# Patient Record
Sex: FEMALE | Race: BLACK OR AFRICAN AMERICAN | NOT HISPANIC OR LATINO | Employment: OTHER | URBAN - METROPOLITAN AREA
[De-identification: names, ages, dates, MRNs, and addresses within clinical notes are randomized per-mention and may not be internally consistent; named-entity substitution may affect disease eponyms.]

---

## 2023-02-13 PROBLEM — R07.9 CHEST PAIN: Status: ACTIVE | Noted: 2023-02-13

## 2023-02-13 PROBLEM — R92.8 ABNORMAL MAMMOGRAM: Status: ACTIVE | Noted: 2023-02-13

## 2023-02-13 PROBLEM — E55.9 VITAMIN D DEFICIENCY: Status: ACTIVE | Noted: 2023-02-13

## 2023-02-13 PROBLEM — R06.02 SOB (SHORTNESS OF BREATH): Status: ACTIVE | Noted: 2023-02-13

## 2023-02-13 PROBLEM — H61.23 BILATERAL IMPACTED CERUMEN: Status: ACTIVE | Noted: 2023-02-13

## 2023-02-13 PROBLEM — I50.23 ACUTE ON CHRONIC SYSTOLIC HEART FAILURE (MULTI): Status: ACTIVE | Noted: 2023-02-13

## 2023-02-13 PROBLEM — J18.9 COMMUNITY ACQUIRED PNEUMONIA: Status: ACTIVE | Noted: 2023-02-13

## 2023-02-13 PROBLEM — I63.9 CVA (CEREBRAL VASCULAR ACCIDENT) (MULTI): Status: ACTIVE | Noted: 2023-02-13

## 2023-02-13 PROBLEM — G47.33 OSA (OBSTRUCTIVE SLEEP APNEA): Status: ACTIVE | Noted: 2023-02-13

## 2023-02-13 PROBLEM — I51.9 CHRONIC LEFT VENTRICULAR SYSTOLIC DYSFUNCTION: Status: ACTIVE | Noted: 2023-02-13

## 2023-02-13 PROBLEM — I10 HTN (HYPERTENSION): Status: ACTIVE | Noted: 2023-02-13

## 2023-02-13 PROBLEM — E53.8 VITAMIN B12 DEFICIENCY: Status: ACTIVE | Noted: 2023-02-13

## 2023-02-13 PROBLEM — N64.59 ABNORMAL BREAST EXAM: Status: ACTIVE | Noted: 2023-02-13

## 2023-02-13 PROBLEM — I63.9 STROKE (MULTI): Status: ACTIVE | Noted: 2023-02-13

## 2023-02-13 PROBLEM — I50.9 CONGESTIVE HEART FAILURE (MULTI): Status: ACTIVE | Noted: 2023-02-13

## 2023-02-13 PROBLEM — J44.9 CHRONIC OBSTRUCTIVE PULMONARY DISEASE (MULTI): Status: RESOLVED | Noted: 2023-02-13 | Resolved: 2023-02-13

## 2023-02-13 PROBLEM — J44.9 CHRONIC OBSTRUCTIVE PULMONARY DISEASE (MULTI): Status: ACTIVE | Noted: 2023-02-13

## 2023-02-13 PROBLEM — I42.9 CARDIOMYOPATHY (MULTI): Status: ACTIVE | Noted: 2023-02-13

## 2023-02-13 PROBLEM — Z95.810 PRESENCE OF CARDIAC DEFIBRILLATOR: Status: ACTIVE | Noted: 2023-02-13

## 2023-02-13 PROBLEM — M54.50 LOW BACK PAIN: Status: ACTIVE | Noted: 2023-02-13

## 2023-02-13 PROBLEM — I42.8 NONISCHEMIC CARDIOMYOPATHY (MULTI): Status: ACTIVE | Noted: 2023-02-13

## 2023-02-13 PROBLEM — Z86.79 HISTORY OF SUSTAINED VENTRICULAR TACHYCARDIA: Status: ACTIVE | Noted: 2023-02-13

## 2023-02-13 PROBLEM — R53.83 FATIGUE: Status: ACTIVE | Noted: 2023-02-13

## 2023-02-13 RX ORDER — HYDRALAZINE HYDROCHLORIDE 50 MG/1
1 TABLET, FILM COATED ORAL 2 TIMES DAILY
COMMUNITY
Start: 2019-04-26 | End: 2023-03-29

## 2023-02-13 RX ORDER — TIOTROPIUM BROMIDE 18 UG/1
1 CAPSULE ORAL; RESPIRATORY (INHALATION)
COMMUNITY
Start: 2016-02-29 | End: 2023-03-29

## 2023-02-13 RX ORDER — MULTIVITAMIN
1 TABLET ORAL
COMMUNITY
Start: 2020-10-28 | End: 2024-01-25

## 2023-02-13 RX ORDER — CARVEDILOL 25 MG/1
25 TABLET ORAL
COMMUNITY
Start: 2017-09-13 | End: 2023-10-27

## 2023-02-13 RX ORDER — FLUTICASONE PROPIONATE AND SALMETEROL 250; 50 UG/1; UG/1
1 POWDER RESPIRATORY (INHALATION) EVERY 12 HOURS
COMMUNITY
Start: 2017-12-12 | End: 2024-04-22 | Stop reason: ALTCHOICE

## 2023-02-13 RX ORDER — NITROGLYCERIN 0.4 MG/1
0.4 TABLET SUBLINGUAL EVERY 5 MIN PRN
COMMUNITY
Start: 2016-12-29 | End: 2023-04-28

## 2023-02-13 RX ORDER — FUROSEMIDE 40 MG/1
1 TABLET ORAL
COMMUNITY
Start: 2021-02-12 | End: 2023-05-30

## 2023-02-13 RX ORDER — BUPROPION HYDROCHLORIDE 300 MG/1
300 TABLET ORAL
COMMUNITY
End: 2024-01-02

## 2023-02-13 RX ORDER — MIRTAZAPINE 15 MG/1
1 TABLET, FILM COATED ORAL NIGHTLY
COMMUNITY
Start: 2020-06-30 | End: 2023-05-01 | Stop reason: ALTCHOICE

## 2023-02-13 RX ORDER — SPIRONOLACTONE 25 MG/1
1 TABLET ORAL 2 TIMES DAILY
COMMUNITY
Start: 2015-08-03 | End: 2023-10-27

## 2023-02-13 RX ORDER — LISINOPRIL 40 MG/1
1 TABLET ORAL
COMMUNITY
Start: 2016-03-17 | End: 2024-01-25

## 2023-02-13 RX ORDER — ATORVASTATIN CALCIUM 40 MG/1
1 TABLET, FILM COATED ORAL
COMMUNITY
Start: 2017-09-28 | End: 2023-06-28

## 2023-02-13 RX ORDER — ALBUTEROL SULFATE 90 UG/1
2 AEROSOL, METERED RESPIRATORY (INHALATION) 2 TIMES DAILY
COMMUNITY
Start: 2016-05-02 | End: 2023-06-14

## 2023-02-13 RX ORDER — DIGOXIN 125 MCG
1 TABLET ORAL
COMMUNITY
Start: 2017-07-10 | End: 2024-01-25

## 2023-02-13 RX ORDER — ALBUTEROL SULFATE 0.83 MG/ML
2.5 SOLUTION RESPIRATORY (INHALATION) EVERY 4 HOURS PRN
COMMUNITY
Start: 2016-12-29 | End: 2023-05-01

## 2023-02-13 RX ORDER — NAPROXEN SODIUM 220 MG/1
1 TABLET, FILM COATED ORAL
COMMUNITY
Start: 2017-06-08 | End: 2023-06-28

## 2023-02-13 RX ORDER — VIT C/E/ZN/COPPR/LUTEIN/ZEAXAN 250MG-90MG
1 CAPSULE ORAL
COMMUNITY
Start: 2021-03-08 | End: 2023-03-29

## 2023-03-27 ENCOUNTER — APPOINTMENT (OUTPATIENT)
Dept: PRIMARY CARE | Facility: CLINIC | Age: 68
End: 2023-03-27
Payer: MEDICAID

## 2023-03-27 DIAGNOSIS — D50.9 IRON DEFICIENCY ANEMIA, UNSPECIFIED IRON DEFICIENCY ANEMIA TYPE: ICD-10-CM

## 2023-03-27 DIAGNOSIS — E03.9 HYPOTHYROIDISM, UNSPECIFIED TYPE: ICD-10-CM

## 2023-03-27 DIAGNOSIS — I10 HYPERTENSION, UNSPECIFIED TYPE: ICD-10-CM

## 2023-03-27 DIAGNOSIS — I10 BENIGN ESSENTIAL HYPERTENSION: ICD-10-CM

## 2023-03-27 LAB
ALANINE AMINOTRANSFERASE (SGPT) (U/L) IN SER/PLAS: 12 U/L (ref 7–45)
ALBUMIN (G/DL) IN SER/PLAS: 4.1 G/DL (ref 3.4–5)
ALKALINE PHOSPHATASE (U/L) IN SER/PLAS: 82 U/L (ref 33–136)
ANION GAP IN SER/PLAS: 15 MMOL/L (ref 10–20)
ASPARTATE AMINOTRANSFERASE (SGOT) (U/L) IN SER/PLAS: 94 U/L (ref 9–39)
BILIRUBIN TOTAL (MG/DL) IN SER/PLAS: 0.6 MG/DL (ref 0–1.2)
CALCIUM (MG/DL) IN SER/PLAS: 9.6 MG/DL (ref 8.6–10.6)
CARBON DIOXIDE, TOTAL (MMOL/L) IN SER/PLAS: 26 MMOL/L (ref 21–32)
CHLORIDE (MMOL/L) IN SER/PLAS: 104 MMOL/L (ref 98–107)
CREATININE (MG/DL) IN SER/PLAS: 0.96 MG/DL (ref 0.5–1.05)
ERYTHROCYTE DISTRIBUTION WIDTH (RATIO) BY AUTOMATED COUNT: 12.8 % (ref 11.5–14.5)
ERYTHROCYTE MEAN CORPUSCULAR HEMOGLOBIN CONCENTRATION (G/DL) BY AUTOMATED: 32.1 G/DL (ref 32–36)
ERYTHROCYTE MEAN CORPUSCULAR VOLUME (FL) BY AUTOMATED COUNT: 97 FL (ref 80–100)
ERYTHROCYTES (10*6/UL) IN BLOOD BY AUTOMATED COUNT: 4.65 X10E12/L (ref 4–5.2)
GFR FEMALE: 65 ML/MIN/1.73M2
GLUCOSE (MG/DL) IN SER/PLAS: 85 MG/DL (ref 74–99)
HEMATOCRIT (%) IN BLOOD BY AUTOMATED COUNT: 45.2 % (ref 36–46)
HEMOGLOBIN (G/DL) IN BLOOD: 14.5 G/DL (ref 12–16)
LEUKOCYTES (10*3/UL) IN BLOOD BY AUTOMATED COUNT: 9.6 X10E9/L (ref 4.4–11.3)
NRBC (PER 100 WBCS) BY AUTOMATED COUNT: 0 /100 WBC (ref 0–0)
PLATELETS (10*3/UL) IN BLOOD AUTOMATED COUNT: 227 X10E9/L (ref 150–450)
POTASSIUM (MMOL/L) IN SER/PLAS: 4.2 MMOL/L (ref 3.5–5.3)
PROTEIN TOTAL: 6.6 G/DL (ref 6.4–8.2)
SODIUM (MMOL/L) IN SER/PLAS: 141 MMOL/L (ref 136–145)
UREA NITROGEN (MG/DL) IN SER/PLAS: 12 MG/DL (ref 6–23)

## 2023-03-27 PROCEDURE — 80053 COMPREHEN METABOLIC PANEL: CPT

## 2023-03-27 PROCEDURE — 85027 COMPLETE CBC AUTOMATED: CPT

## 2023-03-27 PROCEDURE — 36415 COLL VENOUS BLD VENIPUNCTURE: CPT | Performed by: INTERNAL MEDICINE

## 2023-03-28 DIAGNOSIS — E55.9 VITAMIN D DEFICIENCY: ICD-10-CM

## 2023-03-28 DIAGNOSIS — I10 HYPERTENSION, UNSPECIFIED TYPE: ICD-10-CM

## 2023-03-28 DIAGNOSIS — I50.9 CONGESTIVE HEART FAILURE, UNSPECIFIED HF CHRONICITY, UNSPECIFIED HEART FAILURE TYPE (MULTI): ICD-10-CM

## 2023-03-29 RX ORDER — AMPICILLIN TRIHYDRATE 500 MG
CAPSULE ORAL
Qty: 30 CAPSULE | Refills: 10 | Status: SHIPPED | OUTPATIENT
Start: 2023-03-29 | End: 2024-02-26

## 2023-03-29 RX ORDER — TIOTROPIUM BROMIDE 18 UG/1
CAPSULE ORAL; RESPIRATORY (INHALATION)
Qty: 30 CAPSULE | Refills: 10 | Status: SHIPPED | OUTPATIENT
Start: 2023-03-29 | End: 2024-02-26

## 2023-03-29 RX ORDER — HYDRALAZINE HYDROCHLORIDE 50 MG/1
TABLET, FILM COATED ORAL
Qty: 60 TABLET | Refills: 10 | Status: SHIPPED | OUTPATIENT
Start: 2023-03-29 | End: 2024-02-26

## 2023-04-27 DIAGNOSIS — I63.9 CEREBROVASCULAR ACCIDENT (CVA), UNSPECIFIED MECHANISM (MULTI): ICD-10-CM

## 2023-04-28 RX ORDER — NITROGLYCERIN 0.4 MG/1
TABLET SUBLINGUAL
Qty: 25 TABLET | Refills: 10 | Status: SHIPPED | OUTPATIENT
Start: 2023-04-28 | End: 2024-04-22 | Stop reason: SDUPTHER

## 2023-04-28 ASSESSMENT — PROMIS GLOBAL HEALTH SCALE
RATE_SOCIAL_SATISFACTION: EXCELLENT
EMOTIONAL_PROBLEMS: NEVER
RATE_GENERAL_HEALTH: VERY GOOD
RATE_MENTAL_HEALTH: EXCELLENT
RATE_QUALITY_OF_LIFE: EXCELLENT
CARRYOUT_SOCIAL_ACTIVITIES: EXCELLENT
RATE_PHYSICAL_HEALTH: VERY GOOD
CARRYOUT_PHYSICAL_ACTIVITIES: A LITTLE
RATE_AVERAGE_PAIN: 0

## 2023-05-01 ENCOUNTER — OFFICE VISIT (OUTPATIENT)
Dept: PRIMARY CARE | Facility: CLINIC | Age: 68
End: 2023-05-01
Payer: MEDICAID

## 2023-05-01 VITALS
BODY MASS INDEX: 26.52 KG/M2 | DIASTOLIC BLOOD PRESSURE: 76 MMHG | RESPIRATION RATE: 12 BRPM | HEART RATE: 60 BPM | HEIGHT: 66 IN | WEIGHT: 165 LBS | SYSTOLIC BLOOD PRESSURE: 112 MMHG | OXYGEN SATURATION: 96 %

## 2023-05-01 DIAGNOSIS — J43.9 PULMONARY EMPHYSEMA, UNSPECIFIED EMPHYSEMA TYPE (MULTI): ICD-10-CM

## 2023-05-01 DIAGNOSIS — I63.039 CEREBROVASCULAR ACCIDENT (CVA) DUE TO THROMBOSIS OF CAROTID ARTERY, UNSPECIFIED BLOOD VESSEL LATERALITY (MULTI): ICD-10-CM

## 2023-05-01 DIAGNOSIS — Z00.00 ENCOUNTER FOR ANNUAL WELLNESS VISIT (AWV) IN MEDICARE PATIENT: ICD-10-CM

## 2023-05-01 DIAGNOSIS — I42.9 CARDIOMYOPATHY, UNSPECIFIED TYPE (MULTI): Primary | ICD-10-CM

## 2023-05-01 DIAGNOSIS — F41.9 ANXIETY: ICD-10-CM

## 2023-05-01 DIAGNOSIS — E55.9 VITAMIN D DEFICIENCY: ICD-10-CM

## 2023-05-01 DIAGNOSIS — I10 HYPERTENSION, UNSPECIFIED TYPE: ICD-10-CM

## 2023-05-01 DIAGNOSIS — I50.9 CONGESTIVE HEART FAILURE, UNSPECIFIED HF CHRONICITY, UNSPECIFIED HEART FAILURE TYPE (MULTI): ICD-10-CM

## 2023-05-01 PROBLEM — Z86.73 HX OF ARTERIAL ISCHEMIC STROKE: Status: ACTIVE | Noted: 2020-08-10

## 2023-05-01 PROBLEM — I48.0 PAROXYSMAL ATRIAL FIBRILLATION (MULTI): Status: ACTIVE | Noted: 2020-05-19

## 2023-05-01 PROBLEM — I67.1 CEREBRAL ANEURYSM, NONRUPTURED (HHS-HCC): Status: ACTIVE | Noted: 2020-08-11

## 2023-05-01 PROBLEM — I72.0 CAROTID ANEURYSM, LEFT (CMS-HCC): Status: ACTIVE | Noted: 2020-05-19

## 2023-05-01 PROBLEM — F19.10 POLYSUBSTANCE ABUSE (MULTI): Status: ACTIVE | Noted: 2020-05-19

## 2023-05-01 PROBLEM — U07.1 COVID-19: Status: ACTIVE | Noted: 2021-05-19

## 2023-05-01 PROBLEM — F17.200 NICOTINE USE DISORDER: Status: ACTIVE | Noted: 2020-05-20

## 2023-05-01 PROBLEM — K21.9 GERD (GASTROESOPHAGEAL REFLUX DISEASE): Status: ACTIVE | Noted: 2020-05-19

## 2023-05-01 PROBLEM — E78.5 HLD (HYPERLIPIDEMIA): Status: ACTIVE | Noted: 2020-05-19

## 2023-05-01 PROBLEM — F14.90 COCAINE USE: Status: ACTIVE | Noted: 2020-05-19

## 2023-05-01 LAB
POC APPEARANCE, URINE: CLEAR
POC BILIRUBIN, URINE: NEGATIVE
POC BLOOD, URINE: NEGATIVE
POC COLOR, URINE: ABNORMAL
POC GLUCOSE, URINE: NEGATIVE MG/DL
POC KETONES, URINE: NEGATIVE MG/DL
POC LEUKOCYTES, URINE: NEGATIVE
POC NITRITE,URINE: NEGATIVE
POC PH, URINE: 6 PH
POC PROTEIN, URINE: ABNORMAL MG/DL
POC SPECIFIC GRAVITY, URINE: >=1.03
POC UROBILINOGEN, URINE: 0.2 EU/DL

## 2023-05-01 PROCEDURE — 93000 ELECTROCARDIOGRAM COMPLETE: CPT | Performed by: INTERNAL MEDICINE

## 2023-05-01 PROCEDURE — 1159F MED LIST DOCD IN RCRD: CPT | Performed by: INTERNAL MEDICINE

## 2023-05-01 PROCEDURE — 1160F RVW MEDS BY RX/DR IN RCRD: CPT | Performed by: INTERNAL MEDICINE

## 2023-05-01 PROCEDURE — 3074F SYST BP LT 130 MM HG: CPT | Performed by: INTERNAL MEDICINE

## 2023-05-01 PROCEDURE — 81002 URINALYSIS NONAUTO W/O SCOPE: CPT | Performed by: INTERNAL MEDICINE

## 2023-05-01 PROCEDURE — 99214 OFFICE O/P EST MOD 30 MIN: CPT | Performed by: INTERNAL MEDICINE

## 2023-05-01 PROCEDURE — 3078F DIAST BP <80 MM HG: CPT | Performed by: INTERNAL MEDICINE

## 2023-05-01 RX ORDER — BUSPIRONE HYDROCHLORIDE 5 MG/1
5 TABLET ORAL 2 TIMES DAILY
Qty: 180 TABLET | Refills: 3 | Status: SHIPPED | OUTPATIENT
Start: 2023-05-01 | End: 2023-05-03 | Stop reason: SDUPTHER

## 2023-05-01 RX ORDER — IPRATROPIUM BROMIDE AND ALBUTEROL SULFATE 2.5; .5 MG/3ML; MG/3ML
3 SOLUTION RESPIRATORY (INHALATION) 4 TIMES DAILY PRN
Qty: 90 ML | Refills: 11 | Status: SHIPPED | OUTPATIENT
Start: 2023-05-01 | End: 2023-05-03 | Stop reason: SDUPTHER

## 2023-05-01 ASSESSMENT — PATIENT HEALTH QUESTIONNAIRE - PHQ9
SUM OF ALL RESPONSES TO PHQ9 QUESTIONS 1 AND 2: 0
2. FEELING DOWN, DEPRESSED OR HOPELESS: NOT AT ALL
1. LITTLE INTEREST OR PLEASURE IN DOING THINGS: NOT AT ALL

## 2023-05-01 ASSESSMENT — ENCOUNTER SYMPTOMS
LOSS OF SENSATION IN FEET: 0
OCCASIONAL FEELINGS OF UNSTEADINESS: 0
DEPRESSION: 0
NERVOUS/ANXIOUS: 1

## 2023-05-01 NOTE — PROGRESS NOTES
"Subjective :  Chief Complaint: Chiki Pineda is an 68 y.o. female here for an annual wellness visit and general medical care and f/u.     HPI:  Patient presents for annual wellness. Follow up for cardiomyopathy, HTN, HLD, stroke, CHF, COPD, s/p pacemaker and ICD. Patient says she is still smoking. Patient having nervousness and anxiety since her stroke.        Review of Systems   Psychiatric/Behavioral:  The patient is nervous/anxious.      All systems reviewed and negative except for what was mentioned in the HPI    Objective   /76   Pulse 60   Resp 12   Ht 1.676 m (5' 6\")   Wt 74.8 kg (165 lb)   SpO2 96%   BMI 26.63 kg/m²     Physical Exam  Vitals reviewed.   Constitutional:       Appearance: Normal appearance.   HENT:      Head: Normocephalic and atraumatic.   Cardiovascular:      Rate and Rhythm: Normal rate and regular rhythm.   Pulmonary:      Effort: Pulmonary effort is normal.      Breath sounds: Normal breath sounds.   Abdominal:      General: Bowel sounds are normal.      Palpations: Abdomen is soft.   Musculoskeletal:      Cervical back: Neck supple.   Skin:     General: Skin is warm and dry.   Neurological:      General: No focal deficit present.      Mental Status: She is alert.   Psychiatric:         Mood and Affect: Mood normal.         Behavior: Behavior is cooperative.         Imaging:  No results found.     Labs reviewed:    Lab Results   Component Value Date    WBC 9.6 03/27/2023    HGB 14.5 03/27/2023    HCT 45.2 03/27/2023     03/27/2023    CHOL 87 05/20/2022    TRIG 50 05/20/2022    HDL 46.7 05/20/2022    ALT 12 03/27/2023    AST 94 (H) 03/27/2023     03/27/2023    K 4.2 03/27/2023     03/27/2023    CREATININE 0.96 03/27/2023    BUN 12 03/27/2023    CO2 26 03/27/2023    TSH 2.08 05/20/2022    INR 1.1 07/02/2022    HGBA1C 6.2 06/23/2020       Past Medical, Surgical, and Family History reviewed and updated in chart.    I have reviewed and reconciled the medication " list with the patient today.   Current Outpatient Medications:     albuterol 90 mcg/actuation inhaler, Inhale 2 puffs 2 times a day., Disp: , Rfl:     aspirin 81 mg chewable tablet, Chew 1 tablet (81 mg) once every day., Disp: , Rfl:     atorvastatin (Lipitor) 40 mg tablet, Take 1 tablet (40 mg) by mouth once every day., Disp: , Rfl:     buPROPion XL (Wellbutrin XL) 300 mg 24 hr tablet, Take 1 tablet (300 mg) by mouth once every day. Do not crush, chew, or split., Disp: , Rfl:     carbamide peroxide (Debrox) 6.5 % otic solution, Administer 5 drops into each ear 2 times a day., Disp: , Rfl:     carvedilol (Coreg) 25 mg tablet, Take 1 tablet (25 mg) by mouth 2 times a day with meals., Disp: , Rfl:     digoxin (Lanoxin) 125 MCG tablet, Take 1 tablet (125 mcg) by mouth once every day., Disp: , Rfl:     fluticasone propion-salmeteroL (Advair Diskus) 250-50 mcg/dose diskus inhaler, Inhale 1 puff every 12 hours., Disp: , Rfl:     furosemide (Lasix) 40 mg tablet, Take 1 tablet (40 mg) by mouth once every day., Disp: , Rfl:     hydrALAZINE (Apresoline) 50 mg tablet, TAKE ONE (1) TABLET BY MOUTH TWICE DAILY, Disp: 60 tablet, Rfl: 10    lisinopril 40 mg tablet, Take 1 tablet (40 mg) by mouth once every day., Disp: , Rfl:     multivitamin tablet, Take 1 tablet by mouth once every day., Disp: , Rfl:     nitroglycerin (Nitrostat) 0.4 mg SL tablet, DISSOLVE 1 TABLET UNDER THE TONGUE AS NEEDED FOR CHEST PAIN EVERY 5 MINUTES UP TO 3 TIMES. IF NO RELIEF CALL 911., Disp: 25 tablet, Rfl: 10    Spiriva with HandiHaler 18 mcg inhalation capsule, INHALE THE CONTENTS OF ONE (1) CAPSULE BY MOUTH VIA HANDIHALER ONCE DAILY AS DIRECTED *DO NOT SWALLOW CAPSULE*, Disp: 30 capsule, Rfl: 10    spironolactone (Aldactone) 25 mg tablet, Take 1 tablet (25 mg) by mouth 2 times a day., Disp: , Rfl:     Vitamin D3 25 mcg (1,000 unit) capsule, TAKE 1 CAPSULE BY MOUTH ONCE DAILY, Disp: 30 capsule, Rfl: 10    busPIRone (Buspar) 5 mg tablet, Take 1 tablet (5  mg) by mouth 2 times a day., Disp: 180 tablet, Rfl: 3    ipratropium-albuteroL (Duo-Neb) 0.5-2.5 mg/3 mL nebulizer solution, Take 3 mL by nebulization 4 times a day as needed for wheezing or shortness of breath., Disp: 90 mL, Rfl: 11     List of current healthcare providers:  Patient Care Team:  Ernestina Flynn MD as PCP - General     HRA:     Over the past 2 weeks, how often have you been bothered by any of the following problems?  Little interest or pleasure in doing things: Not at all  Feeling down, depressed, or hopeless: Not at all  Patient Health Questionnaire-2 Score: 0                         Assessment/Plan :  Problem List Items Addressed This Visit          Nervous    CVA (cerebral vascular accident) (CMS/HCC)     Continue statin, aspirin            Respiratory    Chronic obstructive pulmonary disease (CMS/HCC)     Discontinue advair and start symbicort and duoneb         Relevant Medications    ipratropium-albuteroL (Duo-Neb) 0.5-2.5 mg/3 mL nebulizer solution       Circulatory    Cardiomyopathy (CMS/HCC) - Primary     Contiue diuresis and ACE inhibitors, spirinolactone         Congestive heart failure (CMS/HCC)     Contiue diuresis and ACE inhibitors, spirinolactone         HTN (hypertension)     Continue lisinopril         Relevant Orders    ECG 12 lead (Clinic Performed) (Completed)       Endocrine/Metabolic    Vitamin D deficiency     Continue vitamin D          Other Visit Diagnoses       Encounter for annual wellness visit (AWV) in Medicare patient        Relevant Orders    ECG 12 lead (Clinic Performed) (Completed)    POCT UA (nonautomated) manually resulted (Completed)    Anxiety        Relevant Medications    busPIRone (Buspar) 5 mg tablet          The following health maintenance schedule was reviewed with the patient and provided in printed form in the after visit summary:  Health Maintenance   Topic Date Due    Yearly Adult Physical  Never done    Bone Density Scan  Never done    Colorectal  Cancer Screening  Never done    COVID-19 Vaccine (1) Never done    Pneumococcal Vaccine: 65+ Years (1 - PCV) Never done    Hepatitis C Screening  Never done    DTaP/Tdap/Td Vaccines (1 - Tdap) Never done    Zoster Vaccines (1 of 2) Never done    Diabetes Screening  06/23/2021    Mammogram  02/01/2023    TSH Level  05/20/2023    Influenza Vaccine (Season Ended) 09/01/2023    Echocardiogram  09/12/2023    Creatinine Level  03/27/2024    Potassium Level  03/27/2024    Lipid Panel  05/20/2027    HIB Vaccines  Aged Out    Hepatitis B Vaccines  Aged Out    IPV Vaccines  Aged Out    Hepatitis A Vaccines  Aged Out    Meningococcal Vaccine  Aged Out    Rotavirus Vaccines  Aged Out    HPV Vaccines  Aged Out       Advance Care Planning   No living will             Orders Placed This Encounter   Procedures    POCT UA (nonautomated) manually resulted    ECG 12 lead (Clinic Performed)       Continue current medications as listed  Follow up in 3 months  Check CMP  Abnormal AST, normal ALT - patient on statin  Schedule colonoscopy and mammogram

## 2023-05-03 DIAGNOSIS — J43.9 PULMONARY EMPHYSEMA, UNSPECIFIED EMPHYSEMA TYPE (MULTI): ICD-10-CM

## 2023-05-03 DIAGNOSIS — F41.9 ANXIETY: ICD-10-CM

## 2023-05-03 RX ORDER — BUSPIRONE HYDROCHLORIDE 5 MG/1
5 TABLET ORAL 2 TIMES DAILY
Qty: 180 TABLET | Refills: 3 | Status: SHIPPED | OUTPATIENT
Start: 2023-05-03 | End: 2024-02-26

## 2023-05-03 RX ORDER — IPRATROPIUM BROMIDE AND ALBUTEROL SULFATE 2.5; .5 MG/3ML; MG/3ML
3 SOLUTION RESPIRATORY (INHALATION) 4 TIMES DAILY PRN
Qty: 90 ML | Refills: 11 | Status: SHIPPED | OUTPATIENT
Start: 2023-05-03 | End: 2023-10-16 | Stop reason: SDUPTHER

## 2023-05-19 ENCOUNTER — APPOINTMENT (OUTPATIENT)
Dept: PRIMARY CARE | Facility: CLINIC | Age: 68
End: 2023-05-19
Payer: MEDICAID

## 2023-05-28 DIAGNOSIS — I50.9 CONGESTIVE HEART FAILURE, UNSPECIFIED HF CHRONICITY, UNSPECIFIED HEART FAILURE TYPE (MULTI): ICD-10-CM

## 2023-05-30 RX ORDER — FUROSEMIDE 40 MG/1
TABLET ORAL
Qty: 30 TABLET | Refills: 10 | Status: SHIPPED | OUTPATIENT
Start: 2023-05-30 | End: 2024-04-24

## 2023-06-08 DIAGNOSIS — J43.9 PULMONARY EMPHYSEMA, UNSPECIFIED EMPHYSEMA TYPE (MULTI): Primary | ICD-10-CM

## 2023-06-14 RX ORDER — ALBUTEROL SULFATE 90 UG/1
AEROSOL, METERED RESPIRATORY (INHALATION)
Qty: 18 G | Refills: 10 | OUTPATIENT
Start: 2023-06-14 | End: 2024-02-29

## 2023-06-23 ENCOUNTER — APPOINTMENT (OUTPATIENT)
Dept: PRIMARY CARE | Facility: CLINIC | Age: 68
End: 2023-06-23
Payer: MEDICAID

## 2023-06-28 DIAGNOSIS — E78.5 HYPERLIPIDEMIA, UNSPECIFIED HYPERLIPIDEMIA TYPE: ICD-10-CM

## 2023-06-28 RX ORDER — ATORVASTATIN CALCIUM 40 MG/1
TABLET, FILM COATED ORAL
Qty: 30 TABLET | Refills: 10 | Status: SHIPPED | OUTPATIENT
Start: 2023-06-28

## 2023-06-28 RX ORDER — NAPROXEN SODIUM 220 MG/1
TABLET, FILM COATED ORAL
Qty: 30 TABLET | Refills: 10 | Status: SHIPPED | OUTPATIENT
Start: 2023-06-28 | End: 2024-05-23

## 2023-08-15 ENCOUNTER — APPOINTMENT (OUTPATIENT)
Dept: PRIMARY CARE | Facility: CLINIC | Age: 68
End: 2023-08-15
Payer: MEDICAID

## 2023-08-16 ENCOUNTER — APPOINTMENT (OUTPATIENT)
Dept: PRIMARY CARE | Facility: CLINIC | Age: 68
End: 2023-08-16
Payer: MEDICAID

## 2023-08-25 ENCOUNTER — APPOINTMENT (OUTPATIENT)
Dept: PRIMARY CARE | Facility: CLINIC | Age: 68
End: 2023-08-25
Payer: MEDICAID

## 2023-09-08 ENCOUNTER — APPOINTMENT (OUTPATIENT)
Dept: PRIMARY CARE | Facility: CLINIC | Age: 68
End: 2023-09-08
Payer: MEDICAID

## 2023-09-14 ENCOUNTER — APPOINTMENT (OUTPATIENT)
Dept: PRIMARY CARE | Facility: CLINIC | Age: 68
End: 2023-09-14
Payer: MEDICAID

## 2023-10-09 ENCOUNTER — CLINICAL SUPPORT (OUTPATIENT)
Dept: PRIMARY CARE | Facility: CLINIC | Age: 68
End: 2023-10-09
Payer: MEDICAID

## 2023-10-09 DIAGNOSIS — E78.5 HYPERLIPIDEMIA, UNSPECIFIED HYPERLIPIDEMIA TYPE: ICD-10-CM

## 2023-10-09 DIAGNOSIS — E03.9 HYPOTHYROIDISM, UNSPECIFIED TYPE: ICD-10-CM

## 2023-10-09 DIAGNOSIS — D50.9 IRON DEFICIENCY ANEMIA, UNSPECIFIED IRON DEFICIENCY ANEMIA TYPE: ICD-10-CM

## 2023-10-09 DIAGNOSIS — I63.039 CEREBROVASCULAR ACCIDENT (CVA) DUE TO THROMBOSIS OF CAROTID ARTERY, UNSPECIFIED BLOOD VESSEL LATERALITY (MULTI): ICD-10-CM

## 2023-10-09 DIAGNOSIS — E55.9 VITAMIN D DEFICIENCY: ICD-10-CM

## 2023-10-09 DIAGNOSIS — Z00.00 ENCOUNTER FOR ANNUAL WELLNESS VISIT (AWV) IN MEDICARE PATIENT: ICD-10-CM

## 2023-10-09 DIAGNOSIS — I10 BENIGN ESSENTIAL HYPERTENSION: ICD-10-CM

## 2023-10-09 DIAGNOSIS — E78.00 ELEVATED LDL CHOLESTEROL LEVEL: ICD-10-CM

## 2023-10-09 DIAGNOSIS — I10 PRIMARY HYPERTENSION: ICD-10-CM

## 2023-10-09 LAB
25(OH)D3 SERPL-MCNC: 25 NG/ML (ref 30–100)
ALBUMIN SERPL BCP-MCNC: 4.2 G/DL (ref 3.4–5)
ALP SERPL-CCNC: 78 U/L (ref 33–136)
ALT SERPL W P-5'-P-CCNC: 6 U/L (ref 7–45)
ANION GAP SERPL CALC-SCNC: 17 MMOL/L (ref 10–20)
AST SERPL W P-5'-P-CCNC: 13 U/L (ref 9–39)
BILIRUB SERPL-MCNC: 0.9 MG/DL (ref 0–1.2)
BUN SERPL-MCNC: 16 MG/DL (ref 6–23)
CALCIUM SERPL-MCNC: 9.7 MG/DL (ref 8.6–10.6)
CHLORIDE SERPL-SCNC: 105 MMOL/L (ref 98–107)
CHOLEST SERPL-MCNC: 122 MG/DL (ref 0–199)
CHOLESTEROL/HDL RATIO: 2.7
CO2 SERPL-SCNC: 25 MMOL/L (ref 21–32)
CREAT SERPL-MCNC: 1.06 MG/DL (ref 0.5–1.05)
ERYTHROCYTE [DISTWIDTH] IN BLOOD BY AUTOMATED COUNT: 13.2 % (ref 11.5–14.5)
GFR SERPL CREATININE-BSD FRML MDRD: 58 ML/MIN/1.73M*2
GLUCOSE SERPL-MCNC: 71 MG/DL (ref 74–99)
HCT VFR BLD AUTO: 47.8 % (ref 36–46)
HDLC SERPL-MCNC: 45.1 MG/DL
HGB BLD-MCNC: 14.7 G/DL (ref 12–16)
LDLC SERPL CALC-MCNC: 65 MG/DL (ref 140–190)
MCH RBC QN AUTO: 29.9 PG (ref 26–34)
MCHC RBC AUTO-ENTMCNC: 30.8 G/DL (ref 32–36)
MCV RBC AUTO: 97 FL (ref 80–100)
NON HDL CHOLESTEROL: 77 MG/DL (ref 0–149)
NRBC BLD-RTO: 0 /100 WBCS (ref 0–0)
PLATELET # BLD AUTO: 238 X10*3/UL (ref 150–450)
PMV BLD AUTO: 11.6 FL (ref 7.5–11.5)
POTASSIUM SERPL-SCNC: 4.2 MMOL/L (ref 3.5–5.3)
PROT SERPL-MCNC: 6.8 G/DL (ref 6.4–8.2)
RBC # BLD AUTO: 4.92 X10*6/UL (ref 4–5.2)
SODIUM SERPL-SCNC: 143 MMOL/L (ref 136–145)
TRIGL SERPL-MCNC: 61 MG/DL (ref 0–149)
TSH SERPL-ACNC: 3.22 MIU/L (ref 0.44–3.98)
VLDL: 12 MG/DL (ref 0–40)
WBC # BLD AUTO: 7.6 X10*3/UL (ref 4.4–11.3)

## 2023-10-09 PROCEDURE — 80053 COMPREHEN METABOLIC PANEL: CPT

## 2023-10-09 PROCEDURE — 84443 ASSAY THYROID STIM HORMONE: CPT

## 2023-10-09 PROCEDURE — 36415 COLL VENOUS BLD VENIPUNCTURE: CPT

## 2023-10-09 PROCEDURE — 80061 LIPID PANEL: CPT

## 2023-10-09 PROCEDURE — 82306 VITAMIN D 25 HYDROXY: CPT

## 2023-10-09 PROCEDURE — 85027 COMPLETE CBC AUTOMATED: CPT

## 2023-10-16 ENCOUNTER — OFFICE VISIT (OUTPATIENT)
Dept: PRIMARY CARE | Facility: CLINIC | Age: 68
End: 2023-10-16
Payer: MEDICAID

## 2023-10-16 DIAGNOSIS — Z13.820 OSTEOPOROSIS SCREENING: ICD-10-CM

## 2023-10-16 DIAGNOSIS — I42.9 CARDIOMYOPATHY, UNSPECIFIED TYPE (MULTI): Primary | ICD-10-CM

## 2023-10-16 DIAGNOSIS — Z13.820 SCREENING FOR OSTEOPOROSIS: ICD-10-CM

## 2023-10-16 DIAGNOSIS — I10 PRIMARY HYPERTENSION: ICD-10-CM

## 2023-10-16 DIAGNOSIS — Z12.11 COLON CANCER SCREENING: ICD-10-CM

## 2023-10-16 DIAGNOSIS — Z12.31 ENCOUNTER FOR SCREENING MAMMOGRAM FOR BREAST CANCER: ICD-10-CM

## 2023-10-16 DIAGNOSIS — I50.9 CONGESTIVE HEART FAILURE, UNSPECIFIED HF CHRONICITY, UNSPECIFIED HEART FAILURE TYPE (MULTI): ICD-10-CM

## 2023-10-16 DIAGNOSIS — Z12.39 ENCOUNTER FOR SCREENING FOR MALIGNANT NEOPLASM OF BREAST, UNSPECIFIED SCREENING MODALITY: ICD-10-CM

## 2023-10-16 DIAGNOSIS — E53.8 VITAMIN B12 DEFICIENCY: ICD-10-CM

## 2023-10-16 DIAGNOSIS — E55.9 VITAMIN D DEFICIENCY: ICD-10-CM

## 2023-10-16 DIAGNOSIS — J43.9 PULMONARY EMPHYSEMA, UNSPECIFIED EMPHYSEMA TYPE (MULTI): ICD-10-CM

## 2023-10-16 PROCEDURE — 3079F DIAST BP 80-89 MM HG: CPT | Performed by: INTERNAL MEDICINE

## 2023-10-16 PROCEDURE — 1160F RVW MEDS BY RX/DR IN RCRD: CPT | Performed by: INTERNAL MEDICINE

## 2023-10-16 PROCEDURE — 3075F SYST BP GE 130 - 139MM HG: CPT | Performed by: INTERNAL MEDICINE

## 2023-10-16 PROCEDURE — 81003 URINALYSIS AUTO W/O SCOPE: CPT | Performed by: INTERNAL MEDICINE

## 2023-10-16 PROCEDURE — 1159F MED LIST DOCD IN RCRD: CPT | Performed by: INTERNAL MEDICINE

## 2023-10-16 PROCEDURE — 93000 ELECTROCARDIOGRAM COMPLETE: CPT | Performed by: INTERNAL MEDICINE

## 2023-10-16 PROCEDURE — 99397 PER PM REEVAL EST PAT 65+ YR: CPT | Performed by: INTERNAL MEDICINE

## 2023-10-16 PROCEDURE — 99214 OFFICE O/P EST MOD 30 MIN: CPT | Performed by: INTERNAL MEDICINE

## 2023-10-16 RX ORDER — IPRATROPIUM BROMIDE AND ALBUTEROL SULFATE 2.5; .5 MG/3ML; MG/3ML
3 SOLUTION RESPIRATORY (INHALATION) 4 TIMES DAILY PRN
Qty: 90 ML | Refills: 11 | Status: SHIPPED | OUTPATIENT
Start: 2023-10-16 | End: 2024-04-29

## 2023-10-16 RX ORDER — NEBULIZER ACCESSORIES
1 KIT MISCELLANEOUS 3 TIMES DAILY
Qty: 2 EACH | Refills: 2 | Status: SHIPPED | OUTPATIENT
Start: 2023-10-16

## 2023-10-16 ASSESSMENT — PATIENT HEALTH QUESTIONNAIRE - PHQ9
1. LITTLE INTEREST OR PLEASURE IN DOING THINGS: NOT AT ALL
SUM OF ALL RESPONSES TO PHQ9 QUESTIONS 1 AND 2: 0
2. FEELING DOWN, DEPRESSED OR HOPELESS: NOT AT ALL

## 2023-10-16 ASSESSMENT — ENCOUNTER SYMPTOMS
LOSS OF SENSATION IN FEET: 0
DEPRESSION: 0
OCCASIONAL FEELINGS OF UNSTEADINESS: 0

## 2023-10-16 NOTE — PROGRESS NOTES
Subjective   Chief complaint: Chiki Pineda is a 68 y.o. female who presents for Annual Exam.    HPI:  Pt presents for annual physical exam.     Follow up for cardiomyopathy, HTN, HLD, stroke, CHF, COPD, s/p pacemaker and ICD. Patient says she is still smoking.    Vit D deficiency          Objective   There were no vitals taken for this visit.  Physical Exam  Vitals reviewed.   Constitutional:       Appearance: Normal appearance.   Cardiovascular:      Rate and Rhythm: Normal rate and regular rhythm.      Pulses: Normal pulses.      Heart sounds: Normal heart sounds.   Pulmonary:      Effort: Pulmonary effort is normal.      Breath sounds: Normal breath sounds.   Abdominal:      General: Abdomen is flat.      Palpations: Abdomen is soft.   Skin:     General: Skin is warm and dry.   Neurological:      Mental Status: She is alert and oriented to person, place, and time.   Psychiatric:         Mood and Affect: Mood normal.         Behavior: Behavior normal.         I have reviewed and reconciled the medication list with the patient today.   Current Outpatient Medications:     aspirin 81 mg chewable tablet, CHEW AND SWALLOW 1 TABLET BY MOUTH EVERY DAY, Disp: 30 tablet, Rfl: 10    atorvastatin (Lipitor) 40 mg tablet, TAKE 1 TABLET BY MOUTH ONCE DAILY, Disp: 30 tablet, Rfl: 10    buPROPion XL (Wellbutrin XL) 300 mg 24 hr tablet, Take 1 tablet (300 mg) by mouth once every day. Do not crush, chew, or split., Disp: , Rfl:     busPIRone (Buspar) 5 mg tablet, Take 1 tablet (5 mg) by mouth 2 times a day., Disp: 180 tablet, Rfl: 3    carbamide peroxide (Debrox) 6.5 % otic solution, Administer 5 drops into each ear 2 times a day., Disp: , Rfl:     carvedilol (Coreg) 25 mg tablet, Take 1 tablet (25 mg) by mouth 2 times a day with meals., Disp: , Rfl:     digoxin (Lanoxin) 125 MCG tablet, Take 1 tablet (125 mcg) by mouth once every day., Disp: , Rfl:     fluticasone propion-salmeteroL (Advair Diskus) 250-50 mcg/dose diskus  inhaler, Inhale 1 puff every 12 hours., Disp: , Rfl:     furosemide (Lasix) 40 mg tablet, TAKE 1 TABLET BY MOUTH ONCE DAILY, Disp: 30 tablet, Rfl: 10    hydrALAZINE (Apresoline) 50 mg tablet, TAKE ONE (1) TABLET BY MOUTH TWICE DAILY, Disp: 60 tablet, Rfl: 10    ipratropium-albuteroL (Duo-Neb) 0.5-2.5 mg/3 mL nebulizer solution, Take 3 mL by nebulization 4 times a day as needed for wheezing or shortness of breath., Disp: 90 mL, Rfl: 11    lisinopril 40 mg tablet, Take 1 tablet (40 mg) by mouth once every day., Disp: , Rfl:     multivitamin tablet, Take 1 tablet by mouth once every day., Disp: , Rfl:     nitroglycerin (Nitrostat) 0.4 mg SL tablet, DISSOLVE 1 TABLET UNDER THE TONGUE AS NEEDED FOR CHEST PAIN EVERY 5 MINUTES UP TO 3 TIMES. IF NO RELIEF CALL 911., Disp: 25 tablet, Rfl: 10    Spiriva with HandiHaler 18 mcg inhalation capsule, INHALE THE CONTENTS OF ONE (1) CAPSULE BY MOUTH VIA HANDIHALER ONCE DAILY AS DIRECTED *DO NOT SWALLOW CAPSULE*, Disp: 30 capsule, Rfl: 10    spironolactone (Aldactone) 25 mg tablet, Take 1 tablet (25 mg) by mouth 2 times a day., Disp: , Rfl:     Ventolin HFA 90 mcg/actuation inhaler, INHALE 2 PUFFS BY MOUTH TWICE DAILY, Disp: 18 g, Rfl: 10    Vitamin D3 25 mcg (1,000 unit) capsule, TAKE 1 CAPSULE BY MOUTH ONCE DAILY, Disp: 30 capsule, Rfl: 10     Imaging:  No results found.     Labs reviewed:    Lab Results   Component Value Date    WBC 7.6 10/09/2023    HGB 14.7 10/09/2023    HCT 47.8 (H) 10/09/2023     10/09/2023    CHOL 122 10/09/2023    TRIG 61 10/09/2023    HDL 45.1 10/09/2023    ALT 6 (L) 10/09/2023    AST 13 10/09/2023     10/09/2023    K 4.2 10/09/2023     10/09/2023    CREATININE 1.06 (H) 10/09/2023    BUN 16 10/09/2023    CO2 25 10/09/2023    TSH 3.22 10/09/2023    INR 1.1 07/02/2022    HGBA1C 6.2 06/23/2020       Assessment/Plan   Problem List Items Addressed This Visit       Cardiomyopathy (CMS/HCC) - Primary     Continue diuresis and ACE inhibitors,  spirinolactone         Chronic obstructive pulmonary disease (CMS/HCC)     Continue duoneb and symbicort         Congestive heart failure (CMS/HCC)     Continue diuresis and ACE inhibitors, spirinolactone         HTN (hypertension)     Continue lisinopril         Vitamin D deficiency     Start vitamin D supplement          Other Visit Diagnoses       Encounter for screening mammogram for breast cancer                Continue current medications as listed  Follow up in 3 mo monitor labs.

## 2023-10-19 ENCOUNTER — HOSPITAL ENCOUNTER (OUTPATIENT)
Dept: CARDIOLOGY | Facility: CLINIC | Age: 68
Discharge: HOME | End: 2023-10-19
Payer: MEDICAID

## 2023-10-19 DIAGNOSIS — I42.0 CONGESTIVE CARDIOMYOPATHY (MULTI): ICD-10-CM

## 2023-10-19 DIAGNOSIS — I42.9 CARDIOMYOPATHY, UNSPECIFIED TYPE (MULTI): Primary | ICD-10-CM

## 2023-10-19 DIAGNOSIS — Z95.810 PRESENCE OF AUTOMATIC CARDIOVERTER/DEFIBRILLATOR (AICD): ICD-10-CM

## 2023-10-23 ENCOUNTER — HOSPITAL ENCOUNTER (OUTPATIENT)
Dept: CARDIOLOGY | Facility: CLINIC | Age: 68
Discharge: HOME | End: 2023-10-23
Payer: MEDICAID

## 2023-10-23 DIAGNOSIS — Z95.810 PRESENCE OF AUTOMATIC CARDIOVERTER/DEFIBRILLATOR (AICD): ICD-10-CM

## 2023-10-23 DIAGNOSIS — I42.9 CARDIOMYOPATHY, UNSPECIFIED TYPE (MULTI): ICD-10-CM

## 2023-10-23 PROCEDURE — 93296 REM INTERROG EVL PM/IDS: CPT

## 2023-10-23 PROCEDURE — 93295 DEV INTERROG REMOTE 1/2/MLT: CPT | Performed by: INTERNAL MEDICINE

## 2023-10-25 DIAGNOSIS — I10 PRIMARY HYPERTENSION: ICD-10-CM

## 2023-10-27 RX ORDER — CARVEDILOL 25 MG/1
25 TABLET ORAL
Qty: 60 TABLET | Refills: 10 | Status: SHIPPED | OUTPATIENT
Start: 2023-10-27

## 2023-10-27 RX ORDER — SPIRONOLACTONE 25 MG/1
25 TABLET ORAL 2 TIMES DAILY
Qty: 60 TABLET | Refills: 10 | Status: SHIPPED | OUTPATIENT
Start: 2023-10-27

## 2023-11-07 PROCEDURE — 96372 THER/PROPH/DIAG INJ SC/IM: CPT | Performed by: INTERNAL MEDICINE

## 2023-11-07 RX ORDER — CYANOCOBALAMIN 1000 UG/ML
1000 INJECTION, SOLUTION INTRAMUSCULAR; SUBCUTANEOUS ONCE
Status: COMPLETED | OUTPATIENT
Start: 2023-11-07 | End: 2023-11-07

## 2023-11-07 RX ADMIN — CYANOCOBALAMIN 1000 MCG: 1000 INJECTION, SOLUTION INTRAMUSCULAR; SUBCUTANEOUS at 12:59

## 2023-12-29 DIAGNOSIS — F17.200 NICOTINE USE DISORDER: ICD-10-CM

## 2024-01-02 DIAGNOSIS — F17.200 NICOTINE USE DISORDER: ICD-10-CM

## 2024-01-02 RX ORDER — BUPROPION HYDROCHLORIDE 300 MG/1
300 TABLET ORAL DAILY
Qty: 30 TABLET | Refills: 10 | Status: SHIPPED | OUTPATIENT
Start: 2024-01-02 | End: 2024-01-04

## 2024-01-04 RX ORDER — BUPROPION HYDROCHLORIDE 300 MG/1
300 TABLET ORAL DAILY
Qty: 30 TABLET | Refills: 10 | Status: SHIPPED | OUTPATIENT
Start: 2024-01-04

## 2024-01-16 ENCOUNTER — CLINICAL SUPPORT (OUTPATIENT)
Dept: PRIMARY CARE | Facility: CLINIC | Age: 69
End: 2024-01-16
Payer: MEDICAID

## 2024-01-16 DIAGNOSIS — I10 PRIMARY HYPERTENSION: ICD-10-CM

## 2024-01-16 DIAGNOSIS — E78.00 ELEVATED LDL CHOLESTEROL LEVEL: ICD-10-CM

## 2024-01-16 DIAGNOSIS — D50.9 IRON DEFICIENCY ANEMIA, UNSPECIFIED IRON DEFICIENCY ANEMIA TYPE: ICD-10-CM

## 2024-01-16 DIAGNOSIS — I10 BENIGN ESSENTIAL HYPERTENSION: ICD-10-CM

## 2024-01-16 DIAGNOSIS — E03.9 HYPOTHYROIDISM, UNSPECIFIED TYPE: ICD-10-CM

## 2024-01-16 LAB
ALBUMIN SERPL BCP-MCNC: 4 G/DL (ref 3.4–5)
ALP SERPL-CCNC: 69 U/L (ref 33–136)
ALT SERPL W P-5'-P-CCNC: 6 U/L (ref 7–45)
ANION GAP SERPL CALC-SCNC: 13 MMOL/L (ref 10–20)
AST SERPL W P-5'-P-CCNC: 13 U/L (ref 9–39)
BILIRUB SERPL-MCNC: 0.5 MG/DL (ref 0–1.2)
BUN SERPL-MCNC: 15 MG/DL (ref 6–23)
CALCIUM SERPL-MCNC: 9.3 MG/DL (ref 8.6–10.6)
CHLORIDE SERPL-SCNC: 106 MMOL/L (ref 98–107)
CHOLEST SERPL-MCNC: 119 MG/DL (ref 0–199)
CHOLESTEROL/HDL RATIO: 2.3
CO2 SERPL-SCNC: 29 MMOL/L (ref 21–32)
CREAT SERPL-MCNC: 0.94 MG/DL (ref 0.5–1.05)
EGFRCR SERPLBLD CKD-EPI 2021: 67 ML/MIN/1.73M*2
ERYTHROCYTE [DISTWIDTH] IN BLOOD BY AUTOMATED COUNT: 15.3 % (ref 11.5–14.5)
GLUCOSE SERPL-MCNC: 81 MG/DL (ref 74–99)
HCT VFR BLD AUTO: 46.3 % (ref 36–46)
HDLC SERPL-MCNC: 51.7 MG/DL
HGB BLD-MCNC: 14 G/DL (ref 12–16)
LDLC SERPL CALC-MCNC: 57 MG/DL
MCH RBC QN AUTO: 29.1 PG (ref 26–34)
MCHC RBC AUTO-ENTMCNC: 30.2 G/DL (ref 32–36)
MCV RBC AUTO: 96 FL (ref 80–100)
NON HDL CHOLESTEROL: 67 MG/DL (ref 0–149)
NRBC BLD-RTO: 0 /100 WBCS (ref 0–0)
PLATELET # BLD AUTO: 207 X10*3/UL (ref 150–450)
POTASSIUM SERPL-SCNC: 4.5 MMOL/L (ref 3.5–5.3)
PROT SERPL-MCNC: 6.5 G/DL (ref 6.4–8.2)
RBC # BLD AUTO: 4.81 X10*6/UL (ref 4–5.2)
SODIUM SERPL-SCNC: 143 MMOL/L (ref 136–145)
TRIGL SERPL-MCNC: 54 MG/DL (ref 0–149)
VLDL: 11 MG/DL (ref 0–40)
WBC # BLD AUTO: 6.3 X10*3/UL (ref 4.4–11.3)

## 2024-01-16 PROCEDURE — 80053 COMPREHEN METABOLIC PANEL: CPT

## 2024-01-16 PROCEDURE — 85027 COMPLETE CBC AUTOMATED: CPT

## 2024-01-16 PROCEDURE — 80061 LIPID PANEL: CPT

## 2024-01-16 PROCEDURE — 36415 COLL VENOUS BLD VENIPUNCTURE: CPT

## 2024-01-23 ENCOUNTER — OFFICE VISIT (OUTPATIENT)
Dept: PRIMARY CARE | Facility: CLINIC | Age: 69
End: 2024-01-23
Payer: MEDICAID

## 2024-01-23 VITALS
OXYGEN SATURATION: 97 % | DIASTOLIC BLOOD PRESSURE: 79 MMHG | WEIGHT: 156 LBS | BODY MASS INDEX: 25.18 KG/M2 | HEART RATE: 74 BPM | SYSTOLIC BLOOD PRESSURE: 128 MMHG | RESPIRATION RATE: 12 BRPM

## 2024-01-23 DIAGNOSIS — Z12.11 COLON CANCER SCREENING: ICD-10-CM

## 2024-01-23 DIAGNOSIS — Z00.00 HEALTHCARE MAINTENANCE: ICD-10-CM

## 2024-01-23 DIAGNOSIS — E78.5 HYPERLIPIDEMIA, UNSPECIFIED HYPERLIPIDEMIA TYPE: ICD-10-CM

## 2024-01-23 DIAGNOSIS — F41.9 ANXIETY: ICD-10-CM

## 2024-01-23 DIAGNOSIS — J43.9 PULMONARY EMPHYSEMA, UNSPECIFIED EMPHYSEMA TYPE (MULTI): ICD-10-CM

## 2024-01-23 DIAGNOSIS — I10 PRIMARY HYPERTENSION: Primary | ICD-10-CM

## 2024-01-23 PROBLEM — Z20.822 CONTACT WITH AND (SUSPECTED) EXPOSURE TO COVID-19: Status: ACTIVE | Noted: 2022-12-05

## 2024-01-23 PROBLEM — H61.20 IMPACTED CERUMEN: Status: ACTIVE | Noted: 2024-01-23

## 2024-01-23 PROCEDURE — 3074F SYST BP LT 130 MM HG: CPT | Performed by: INTERNAL MEDICINE

## 2024-01-23 PROCEDURE — 3078F DIAST BP <80 MM HG: CPT | Performed by: INTERNAL MEDICINE

## 2024-01-23 PROCEDURE — 1160F RVW MEDS BY RX/DR IN RCRD: CPT | Performed by: INTERNAL MEDICINE

## 2024-01-23 PROCEDURE — 1159F MED LIST DOCD IN RCRD: CPT | Performed by: INTERNAL MEDICINE

## 2024-01-23 PROCEDURE — 99214 OFFICE O/P EST MOD 30 MIN: CPT | Performed by: INTERNAL MEDICINE

## 2024-01-23 NOTE — ASSESSMENT & PLAN NOTE
/79 today in office. Continue BP meds as directed. Will continue to monitor at follow-up appointment.

## 2024-01-23 NOTE — ASSESSMENT & PLAN NOTE
1/16/2024 lipid panel wnl. Continue atorvastatin as directed. Will continue to monitor at follow-up appointment.

## 2024-01-23 NOTE — ASSESSMENT & PLAN NOTE
Start mirtazapine 15 mg daily - will help with anxiety, depression, and appetite. Continue other anxiety medications as directed. Will continue to monitor at follow-up.

## 2024-01-23 NOTE — ASSESSMENT & PLAN NOTE
Patient expressed interest in switching COPD maintenance medications. Discussed risks and benefits with patient and used shared decision making - patient will continue current COPD maintenance regimen.

## 2024-01-23 NOTE — ASSESSMENT & PLAN NOTE
Patient requesting referral for colonoscopy after previously electing for Cologuard. Referral for colonoscopy placed.

## 2024-01-23 NOTE — PROGRESS NOTES
Subjective   Chief complaint: Chiki Pineda is a 68 y.o. female who presents for Follow-up (Pt is here for blood work follow up, pt c/o nose bleeds on right side only ).    HPI:  Patient presenting to office to follow-up on blood work - CBC, CMP, lipid panel. She also has questions regarding screenings (mammogram, colonoscopy, pap smear, DEXA), as well as some of her medications. Additionally, she is reporting intermittent bleeding from her R nostril x 2-3 days.        Objective   /79   Pulse 74   Resp 12   Wt 70.8 kg (156 lb)   SpO2 97%   BMI 25.18 kg/m²   Physical Exam  Constitutional:       General: She is not in acute distress.     Appearance: Normal appearance.   HENT:      Head: Normocephalic and atraumatic.   Cardiovascular:      Rate and Rhythm: Normal rate and regular rhythm.      Pulses: Normal pulses.      Heart sounds: Normal heart sounds.   Pulmonary:      Effort: Pulmonary effort is normal.      Breath sounds: Normal breath sounds.   Skin:     General: Skin is warm and dry.   Neurological:      General: No focal deficit present.      Mental Status: She is alert and oriented to person, place, and time.         I have reviewed and reconciled the medication list with the patient today.   Current Outpatient Medications:     aspirin 81 mg chewable tablet, CHEW AND SWALLOW 1 TABLET BY MOUTH EVERY DAY, Disp: 30 tablet, Rfl: 10    atorvastatin (Lipitor) 40 mg tablet, TAKE 1 TABLET BY MOUTH ONCE DAILY, Disp: 30 tablet, Rfl: 10    buPROPion XL (Wellbutrin XL) 300 mg 24 hr tablet, TAKE 1 TABLET BY MOUTH DAILY, Disp: 30 tablet, Rfl: 10    busPIRone (Buspar) 5 mg tablet, Take 1 tablet (5 mg) by mouth 2 times a day., Disp: 180 tablet, Rfl: 3    carbamide peroxide (Debrox) 6.5 % otic solution, Administer 5 drops into each ear 2 times a day., Disp: , Rfl:     carvedilol (Coreg) 25 mg tablet, TAKE 1 TABLET BY MOUTH TWICE DAILY WITH MEALS, Disp: 60 tablet, Rfl: 10    digoxin (Lanoxin) 125 MCG tablet, Take 1  tablet (125 mcg) by mouth once every day., Disp: , Rfl:     fluticasone propion-salmeteroL (Advair Diskus) 250-50 mcg/dose diskus inhaler, Inhale 1 puff every 12 hours., Disp: , Rfl:     furosemide (Lasix) 40 mg tablet, TAKE 1 TABLET BY MOUTH ONCE DAILY, Disp: 30 tablet, Rfl: 10    hydrALAZINE (Apresoline) 50 mg tablet, TAKE ONE (1) TABLET BY MOUTH TWICE DAILY, Disp: 60 tablet, Rfl: 10    ipratropium-albuteroL (Duo-Neb) 0.5-2.5 mg/3 mL nebulizer solution, Take 3 mL by nebulization 4 times a day as needed for wheezing or shortness of breath., Disp: 90 mL, Rfl: 11    lisinopril 40 mg tablet, Take 1 tablet (40 mg) by mouth once every day., Disp: , Rfl:     multivitamin tablet, Take 1 tablet by mouth once every day., Disp: , Rfl:     nebulizer accessories (Adult Aerosol Mask) misc, 1 each 3 times a day., Disp: 2 each, Rfl: 2    nitroglycerin (Nitrostat) 0.4 mg SL tablet, DISSOLVE 1 TABLET UNDER THE TONGUE AS NEEDED FOR CHEST PAIN EVERY 5 MINUTES UP TO 3 TIMES. IF NO RELIEF CALL 911., Disp: 25 tablet, Rfl: 10    Spiriva with HandiHaler 18 mcg inhalation capsule, INHALE THE CONTENTS OF ONE (1) CAPSULE BY MOUTH VIA HANDIHALER ONCE DAILY AS DIRECTED *DO NOT SWALLOW CAPSULE*, Disp: 30 capsule, Rfl: 10    spironolactone (Aldactone) 25 mg tablet, TAKE 1 TABLET BY MOUTH TWICE DAILY, Disp: 60 tablet, Rfl: 10    Ventolin HFA 90 mcg/actuation inhaler, INHALE 2 PUFFS BY MOUTH TWICE DAILY, Disp: 18 g, Rfl: 10    Vitamin D3 25 mcg (1,000 unit) capsule, TAKE 1 CAPSULE BY MOUTH ONCE DAILY, Disp: 30 capsule, Rfl: 10     Imaging:  No results found.     Labs reviewed:    Lab Results   Component Value Date    WBC 6.3 01/16/2024    HGB 14.0 01/16/2024    HCT 46.3 (H) 01/16/2024     01/16/2024    CHOL 119 01/16/2024    TRIG 54 01/16/2024    HDL 51.7 01/16/2024    ALT 6 (L) 01/16/2024    AST 13 01/16/2024     01/16/2024    K 4.5 01/16/2024     01/16/2024    CREATININE 0.94 01/16/2024    BUN 15 01/16/2024    CO2 29  01/16/2024    TSH 3.22 10/09/2023    INR 1.1 07/02/2022    HGBA1C 6.2 06/23/2020       Assessment/Plan   Problem List Items Addressed This Visit       Chronic obstructive pulmonary disease (CMS/[facility])     Patient expressed interest in switching COPD maintenance medications. Discussed risks and benefits with patient and used shared decision making - patient will continue current COPD maintenance regimen.         HTN (hypertension) - Primary     /79 today in office. Continue BP meds as directed. Will continue to monitor at follow-up appointment.         HLD (hyperlipidemia)     1/16/2024 lipid panel wnl. Continue atorvastatin as directed. Will continue to monitor at follow-up appointment.         Anxiety     Start mirtazapine 15 mg daily - will help with anxiety, depression, and appetite. Continue other anxiety medications as directed. Will continue to monitor at annual.         Colon cancer screening     Patient requesting referral for colonoscopy after previously electing for Cologuard. Referral for colonoscopy placed.         Healthcare maintenance     Mammogram ordered placed. DEXA scan order placed. Pap smear to be performed at next annual visit.            Continue current medications as listed  Follow up in 3 months

## 2024-01-24 DIAGNOSIS — F41.9 ANXIETY: ICD-10-CM

## 2024-01-24 DIAGNOSIS — Z00.00 HEALTHCARE MAINTENANCE: ICD-10-CM

## 2024-01-24 DIAGNOSIS — I50.9 CONGESTIVE HEART FAILURE, UNSPECIFIED HF CHRONICITY, UNSPECIFIED HEART FAILURE TYPE (MULTI): ICD-10-CM

## 2024-01-24 DIAGNOSIS — I10 PRIMARY HYPERTENSION: ICD-10-CM

## 2024-01-25 RX ORDER — MIRTAZAPINE 15 MG/1
15 TABLET, FILM COATED ORAL NIGHTLY
Qty: 30 TABLET | Refills: 10 | Status: SHIPPED | OUTPATIENT
Start: 2024-01-25 | End: 2024-04-22 | Stop reason: ALTCHOICE

## 2024-01-25 RX ORDER — MULTIVITAMIN
1 TABLET ORAL DAILY
Qty: 30 TABLET | Refills: 10 | Status: SHIPPED | OUTPATIENT
Start: 2024-01-25

## 2024-01-25 RX ORDER — DIGOXIN 125 MCG
125 TABLET ORAL DAILY
Qty: 30 TABLET | Refills: 10 | Status: SHIPPED | OUTPATIENT
Start: 2024-01-25

## 2024-01-25 RX ORDER — LISINOPRIL 40 MG/1
40 TABLET ORAL DAILY
Qty: 30 TABLET | Refills: 10 | Status: SHIPPED | OUTPATIENT
Start: 2024-01-25

## 2024-02-07 ENCOUNTER — APPOINTMENT (OUTPATIENT)
Dept: RADIOLOGY | Facility: CLINIC | Age: 69
End: 2024-02-07
Payer: MEDICAID

## 2024-02-23 DIAGNOSIS — I50.9 CONGESTIVE HEART FAILURE, UNSPECIFIED HF CHRONICITY, UNSPECIFIED HEART FAILURE TYPE (MULTI): ICD-10-CM

## 2024-02-23 DIAGNOSIS — E55.9 VITAMIN D DEFICIENCY: ICD-10-CM

## 2024-02-23 DIAGNOSIS — I10 HYPERTENSION, UNSPECIFIED TYPE: ICD-10-CM

## 2024-02-23 DIAGNOSIS — F41.9 ANXIETY: ICD-10-CM

## 2024-02-26 ENCOUNTER — APPOINTMENT (OUTPATIENT)
Dept: CARDIOLOGY | Facility: HOSPITAL | Age: 69
End: 2024-02-26
Payer: MEDICAID

## 2024-02-26 DIAGNOSIS — I42.9 CARDIOMYOPATHY, UNSPECIFIED TYPE (MULTI): Primary | ICD-10-CM

## 2024-02-26 DIAGNOSIS — Z95.810 PRESENCE OF AUTOMATIC CARDIOVERTER/DEFIBRILLATOR (AICD): ICD-10-CM

## 2024-02-26 RX ORDER — AMPICILLIN TRIHYDRATE 500 MG
CAPSULE ORAL
Qty: 30 CAPSULE | Refills: 10 | Status: SHIPPED | OUTPATIENT
Start: 2024-02-26

## 2024-02-26 RX ORDER — HYDRALAZINE HYDROCHLORIDE 50 MG/1
TABLET, FILM COATED ORAL
Qty: 60 TABLET | Refills: 10 | Status: SHIPPED | OUTPATIENT
Start: 2024-02-26

## 2024-02-26 RX ORDER — TIOTROPIUM BROMIDE 18 UG/1
CAPSULE ORAL; RESPIRATORY (INHALATION)
Qty: 30 CAPSULE | Refills: 10 | Status: SHIPPED | OUTPATIENT
Start: 2024-02-26

## 2024-02-26 RX ORDER — BUSPIRONE HYDROCHLORIDE 5 MG/1
5 TABLET ORAL 2 TIMES DAILY
Qty: 60 TABLET | Refills: 10 | Status: SHIPPED | OUTPATIENT
Start: 2024-02-26 | End: 2024-04-22 | Stop reason: ALTCHOICE

## 2024-02-27 ENCOUNTER — APPOINTMENT (OUTPATIENT)
Dept: RADIOLOGY | Facility: CLINIC | Age: 69
End: 2024-02-27
Payer: MEDICAID

## 2024-02-29 ENCOUNTER — APPOINTMENT (OUTPATIENT)
Dept: RADIOLOGY | Facility: HOSPITAL | Age: 69
End: 2024-02-29
Payer: MEDICAID

## 2024-02-29 ENCOUNTER — HOSPITAL ENCOUNTER (EMERGENCY)
Facility: HOSPITAL | Age: 69
Discharge: HOME | End: 2024-02-29
Attending: INTERNAL MEDICINE
Payer: MEDICAID

## 2024-02-29 ENCOUNTER — APPOINTMENT (OUTPATIENT)
Dept: CARDIOLOGY | Facility: HOSPITAL | Age: 69
End: 2024-02-29
Payer: MEDICAID

## 2024-02-29 VITALS
OXYGEN SATURATION: 99 % | RESPIRATION RATE: 16 BRPM | BODY MASS INDEX: 22.5 KG/M2 | TEMPERATURE: 98.5 F | HEART RATE: 97 BPM | WEIGHT: 140 LBS | DIASTOLIC BLOOD PRESSURE: 84 MMHG | SYSTOLIC BLOOD PRESSURE: 113 MMHG | HEIGHT: 66 IN

## 2024-02-29 DIAGNOSIS — J44.1 COPD EXACERBATION (MULTI): Primary | ICD-10-CM

## 2024-02-29 LAB
ALBUMIN SERPL BCP-MCNC: 4.2 G/DL (ref 3.4–5)
ALP SERPL-CCNC: 76 U/L (ref 33–136)
ALT SERPL W P-5'-P-CCNC: 7 U/L (ref 7–45)
ANION GAP SERPL CALC-SCNC: 16 MMOL/L (ref 10–20)
AST SERPL W P-5'-P-CCNC: 24 U/L (ref 9–39)
BASOPHILS # BLD AUTO: 0.07 X10*3/UL (ref 0–0.1)
BASOPHILS NFR BLD AUTO: 0.8 %
BILIRUB SERPL-MCNC: 0.7 MG/DL (ref 0–1.2)
BNP SERPL-MCNC: 2029 PG/ML (ref 0–99)
BUN SERPL-MCNC: 11 MG/DL (ref 6–23)
CALCIUM SERPL-MCNC: 9.5 MG/DL (ref 8.6–10.3)
CARDIAC TROPONIN I PNL SERPL HS: 56 NG/L (ref 0–13)
CARDIAC TROPONIN I PNL SERPL HS: 62 NG/L (ref 0–13)
CHLORIDE SERPL-SCNC: 108 MMOL/L (ref 98–107)
CO2 SERPL-SCNC: 20 MMOL/L (ref 21–32)
CREAT SERPL-MCNC: 0.96 MG/DL (ref 0.5–1.05)
D DIMER PPP FEU-MCNC: 605 NG/ML FEU
EGFRCR SERPLBLD CKD-EPI 2021: 65 ML/MIN/1.73M*2
EOSINOPHIL # BLD AUTO: 0.11 X10*3/UL (ref 0–0.7)
EOSINOPHIL NFR BLD AUTO: 1.3 %
ERYTHROCYTE [DISTWIDTH] IN BLOOD BY AUTOMATED COUNT: 13.9 % (ref 11.5–14.5)
GLUCOSE SERPL-MCNC: 94 MG/DL (ref 74–99)
HCT VFR BLD AUTO: 48 % (ref 36–46)
HGB BLD-MCNC: 15.4 G/DL (ref 12–16)
IMM GRANULOCYTES # BLD AUTO: 0.02 X10*3/UL (ref 0–0.7)
IMM GRANULOCYTES NFR BLD AUTO: 0.2 % (ref 0–0.9)
LYMPHOCYTES # BLD AUTO: 2.14 X10*3/UL (ref 1.2–4.8)
LYMPHOCYTES NFR BLD AUTO: 25.1 %
MCH RBC QN AUTO: 30.9 PG (ref 26–34)
MCHC RBC AUTO-ENTMCNC: 32.1 G/DL (ref 32–36)
MCV RBC AUTO: 96 FL (ref 80–100)
MONOCYTES # BLD AUTO: 0.64 X10*3/UL (ref 0.1–1)
MONOCYTES NFR BLD AUTO: 7.5 %
NEUTROPHILS # BLD AUTO: 5.54 X10*3/UL (ref 1.2–7.7)
NEUTROPHILS NFR BLD AUTO: 65.1 %
NRBC BLD-RTO: 0 /100 WBCS (ref 0–0)
PLATELET # BLD AUTO: 192 X10*3/UL (ref 150–450)
POTASSIUM SERPL-SCNC: 4 MMOL/L (ref 3.5–5.3)
POTASSIUM SERPL-SCNC: 6 MMOL/L (ref 3.5–5.3)
PROT SERPL-MCNC: 7.2 G/DL (ref 6.4–8.2)
RBC # BLD AUTO: 4.98 X10*6/UL (ref 4–5.2)
SODIUM SERPL-SCNC: 138 MMOL/L (ref 136–145)
TSH SERPL-ACNC: 1.49 MIU/L (ref 0.44–3.98)
WBC # BLD AUTO: 8.5 X10*3/UL (ref 4.4–11.3)

## 2024-02-29 PROCEDURE — 84484 ASSAY OF TROPONIN QUANT: CPT | Performed by: EMERGENCY MEDICINE

## 2024-02-29 PROCEDURE — 84443 ASSAY THYROID STIM HORMONE: CPT | Performed by: INTERNAL MEDICINE

## 2024-02-29 PROCEDURE — 84484 ASSAY OF TROPONIN QUANT: CPT | Mod: 91 | Performed by: INTERNAL MEDICINE

## 2024-02-29 PROCEDURE — 2500000002 HC RX 250 W HCPCS SELF ADMINISTERED DRUGS (ALT 637 FOR MEDICARE OP, ALT 636 FOR OP/ED): Performed by: INTERNAL MEDICINE

## 2024-02-29 PROCEDURE — 85025 COMPLETE CBC W/AUTO DIFF WBC: CPT | Performed by: EMERGENCY MEDICINE

## 2024-02-29 PROCEDURE — 71275 CT ANGIOGRAPHY CHEST: CPT

## 2024-02-29 PROCEDURE — 71275 CT ANGIOGRAPHY CHEST: CPT | Performed by: RADIOLOGY

## 2024-02-29 PROCEDURE — 96374 THER/PROPH/DIAG INJ IV PUSH: CPT | Mod: 59

## 2024-02-29 PROCEDURE — 99285 EMERGENCY DEPT VISIT HI MDM: CPT | Mod: 25

## 2024-02-29 PROCEDURE — 83880 ASSAY OF NATRIURETIC PEPTIDE: CPT | Performed by: EMERGENCY MEDICINE

## 2024-02-29 PROCEDURE — 84132 ASSAY OF SERUM POTASSIUM: CPT | Mod: 59 | Performed by: INTERNAL MEDICINE

## 2024-02-29 PROCEDURE — 93005 ELECTROCARDIOGRAM TRACING: CPT | Mod: 59

## 2024-02-29 PROCEDURE — 2500000004 HC RX 250 GENERAL PHARMACY W/ HCPCS (ALT 636 FOR OP/ED): Performed by: INTERNAL MEDICINE

## 2024-02-29 PROCEDURE — 36415 COLL VENOUS BLD VENIPUNCTURE: CPT | Performed by: EMERGENCY MEDICINE

## 2024-02-29 PROCEDURE — 36415 COLL VENOUS BLD VENIPUNCTURE: CPT | Performed by: INTERNAL MEDICINE

## 2024-02-29 PROCEDURE — 71046 X-RAY EXAM CHEST 2 VIEWS: CPT

## 2024-02-29 PROCEDURE — 94640 AIRWAY INHALATION TREATMENT: CPT

## 2024-02-29 PROCEDURE — 80053 COMPREHEN METABOLIC PANEL: CPT | Performed by: EMERGENCY MEDICINE

## 2024-02-29 PROCEDURE — 85379 FIBRIN DEGRADATION QUANT: CPT | Performed by: INTERNAL MEDICINE

## 2024-02-29 PROCEDURE — 71046 X-RAY EXAM CHEST 2 VIEWS: CPT | Performed by: RADIOLOGY

## 2024-02-29 PROCEDURE — 93005 ELECTROCARDIOGRAM TRACING: CPT

## 2024-02-29 PROCEDURE — 2550000001 HC RX 255 CONTRASTS: Performed by: INTERNAL MEDICINE

## 2024-02-29 RX ORDER — PREDNISONE 20 MG/1
40 TABLET ORAL DAILY
Qty: 5 TABLET | Refills: 0 | Status: SHIPPED | OUTPATIENT
Start: 2024-02-29 | End: 2024-03-05

## 2024-02-29 RX ORDER — ALBUTEROL SULFATE 90 UG/1
2 AEROSOL, METERED RESPIRATORY (INHALATION) EVERY 4 HOURS PRN
Qty: 18 G | Refills: 0 | Status: SHIPPED | OUTPATIENT
Start: 2024-02-29 | End: 2024-04-22

## 2024-02-29 RX ORDER — IPRATROPIUM BROMIDE AND ALBUTEROL SULFATE 2.5; .5 MG/3ML; MG/3ML
3 SOLUTION RESPIRATORY (INHALATION) EVERY 20 MIN
Status: COMPLETED | OUTPATIENT
Start: 2024-02-29 | End: 2024-02-29

## 2024-02-29 RX ADMIN — IPRATROPIUM BROMIDE AND ALBUTEROL SULFATE 3 ML: 2.5; .5 SOLUTION RESPIRATORY (INHALATION) at 18:18

## 2024-02-29 RX ADMIN — IOHEXOL 75 ML: 350 INJECTION, SOLUTION INTRAVENOUS at 19:20

## 2024-02-29 RX ADMIN — IPRATROPIUM BROMIDE AND ALBUTEROL SULFATE 3 ML: 2.5; .5 SOLUTION RESPIRATORY (INHALATION) at 18:38

## 2024-02-29 RX ADMIN — IPRATROPIUM BROMIDE AND ALBUTEROL SULFATE 3 ML: 2.5; .5 SOLUTION RESPIRATORY (INHALATION) at 19:00

## 2024-02-29 RX ADMIN — METHYLPREDNISOLONE SODIUM SUCCINATE 125 MG: 125 INJECTION, POWDER, FOR SOLUTION INTRAMUSCULAR; INTRAVENOUS at 18:19

## 2024-02-29 ASSESSMENT — PAIN - FUNCTIONAL ASSESSMENT: PAIN_FUNCTIONAL_ASSESSMENT: 0-10

## 2024-02-29 ASSESSMENT — COLUMBIA-SUICIDE SEVERITY RATING SCALE - C-SSRS
2. HAVE YOU ACTUALLY HAD ANY THOUGHTS OF KILLING YOURSELF?: NO
1. IN THE PAST MONTH, HAVE YOU WISHED YOU WERE DEAD OR WISHED YOU COULD GO TO SLEEP AND NOT WAKE UP?: NO
6. HAVE YOU EVER DONE ANYTHING, STARTED TO DO ANYTHING, OR PREPARED TO DO ANYTHING TO END YOUR LIFE?: NO

## 2024-02-29 ASSESSMENT — PAIN SCALES - GENERAL: PAINLEVEL_OUTOF10: 0 - NO PAIN

## 2024-02-29 NOTE — ED TRIAGE NOTES
PT PRESENT TO THE ED FOR SOB THAT HAS BEEN GOING ON FOR THE LAST FEW DAYS. PT HAS HX OF COPD AND THINKS SHE IS HAVING A FLARE UP. PT STATES THAT SHE TOOK BREATHING TREATMENTS. PT DENIES FEVERS OR CHILLS. PT DENIES CHEST PAIN. PT HAS HX OF CHF. PT DENIES RETENTION OF FLUID. PT IS A SMOKER.

## 2024-02-29 NOTE — ED PROVIDER NOTES
HPI     CC: Shortness of Breath     HPI: Chiki Pineda is a 69 y.o. female with a history of COPD, HTN, HLD, CHF (EF 20-25%) s/p PPM and AICD, anxiety, presents with shortness of breath and cough.  Patient states she cannot stop coughing and is hard to breathe for the past few days.  She is using her inhalers twice daily but they have not helped at all.  She denies fevers, chills, chest pain, leg swelling or other symptoms.  She states she is compliant with her medications.    ROS: 10-point review of systems was performed and is otherwise negative except as noted in HPI.    Limitations to history: N/A    Independent Historians: N/A    External Records Reviewed: Outpatient notes in EMR    Past Medical History: Noncontributory except per HPI     Past Surgical History: Noncontributory except per HPI     Family History: Reviewed and noncontributory     Social History:  Denies tobacco. Denies ETOH. Denies illicit drugs.    Social Determinants Affecting Care: N/A    Allergies   Allergen Reactions   • Isosorbide Unknown     Takes SL Nitroglycerin at home without problems   • Isosorbide Mononitrate Unknown   • Niacin Unknown       Home Meds:   Current Outpatient Medications   Medication Instructions   • albuterol 90 mcg/actuation inhaler 2 puffs, inhalation, Every 4 hours PRN   • aspirin 81 mg chewable tablet CHEW AND SWALLOW 1 TABLET BY MOUTH EVERY DAY   • atorvastatin (Lipitor) 40 mg tablet TAKE 1 TABLET BY MOUTH ONCE DAILY   • buPROPion XL (WELLBUTRIN XL) 300 mg, oral, Daily   • busPIRone (BUSPAR) 5 mg, oral, 2 times daily   • carbamide peroxide (Debrox) 6.5 % otic solution 5 drops, Each Ear, 2 times daily   • carvedilol (COREG) 25 mg, oral, 2 times daily with meals   • digoxin (LANOXIN) 125 mcg, oral, Daily   • fluticasone propion-salmeteroL (Advair Diskus) 250-50 mcg/dose diskus inhaler 1 puff, inhalation, Every 12 hours   • furosemide (Lasix) 40 mg tablet TAKE 1 TABLET BY MOUTH ONCE DAILY   • hydrALAZINE (Apresoline)  "50 mg tablet TAKE ONE (1) TABLET BY MOUTH TWICE DAILY   • ipratropium-albuteroL (Duo-Neb) 0.5-2.5 mg/3 mL nebulizer solution 3 mL, nebulization, 4 times daily PRN   • lisinopril 40 mg, oral, Daily   • mirtazapine (REMERON) 15 mg, oral, Nightly   • multivitamin tablet 1 tablet, oral, Daily   • nebulizer accessories (Adult Aerosol Mask) misc 1 each, miscellaneous, 3 times daily   • nitroglycerin (Nitrostat) 0.4 mg SL tablet DISSOLVE 1 TABLET UNDER THE TONGUE AS NEEDED FOR CHEST PAIN EVERY 5 MINUTES UP TO 3 TIMES. IF NO RELIEF CALL 911.   • predniSONE (DELTASONE) 40 mg, oral, Daily   • Spiriva with HandiHaler 18 mcg inhalation capsule INHALE THE CONTENTS OF ONE (1) CAPSULE BY MOUTH VIA HANDIHALER ONCE DAILY AS DIRECTED. DO NOT SWALLOW CAPSULE   • spironolactone (ALDACTONE) 25 mg, oral, 2 times daily   • Vitamin D3 25 mcg (1,000 unit) capsule TAKE 1 CAPSULE BY MOUTH ONCE DAILY        Physical Exam     ED Triage Vitals [02/29/24 1658]   Temperature Heart Rate Respirations BP   36.9 °C (98.5 °F) (!) 113 (!) 22 139/85      Pulse Ox Temp src Heart Rate Source Patient Position   100 % -- -- --      BP Location FiO2 (%)     -- --         Heart Rate:  []   Temperature:  [36.9 °C (98.5 °F)]   Respirations:  [16-22]   BP: (113-161)/()   Height:  [167.6 cm (5' 6\")]   Weight:  [63.5 kg (140 lb)]   Pulse Ox:  [96 %-100 %]      Physical Exam  Vitals and nursing note reviewed.     CONSTITUTIONAL: Tired appearing, well nourished, elderly female in no acute distress.   HENMT: Head atraumatic. Airway patent. Nasal mucosa clear. Mouth with normal mucosa, clear oropharynx. Uvula midline. Neck supple.    EYES: Clear bilaterally, pupils equally round and reactive to light.   CARDIOVASCULAR: Normal rate, regular rhythm.  Heart sounds S1, S2.  No murmurs, rubs or gallops. Normal pulses. Capillary refill < 2 sec.   RESPIRATORY: No increased work of breathing. Breath sounds clear on inspiration with end expiratory wheeze throughout " bilateral lung fields.  GASTROINTESTINAL: Abdomen soft, non-distended, non-tender. No rebound, no guarding. Normal bowel sounds. No palpable masses.  GENITOURINARY:  No CVA tenderness.  MUSCULOSKELETAL: Spine appears normal, range of motion is not limited, no muscle or joint tenderness. No edema.   NEUROLOGICAL: Alert and oriented, no asymmetry, moving all extremities equally.  SKIN: Warm, dry and intact. No rash or notable lesions.  PSYCHIATRIC: Normal mood and affect.  HEME/LYMPH: No adenopathy or splenomegaly.    Diagnostic Results      ECG: ECGs read and interpreted by me. See ED Course, below, for interpretation.    Labs Reviewed   CBC WITH AUTO DIFFERENTIAL - Abnormal       Result Value    WBC 8.5      nRBC 0.0      RBC 4.98      Hemoglobin 15.4      Hematocrit 48.0 (*)     MCV 96      MCH 30.9      MCHC 32.1      RDW 13.9      Platelets 192      Neutrophils % 65.1      Immature Granulocytes %, Automated 0.2      Lymphocytes % 25.1      Monocytes % 7.5      Eosinophils % 1.3      Basophils % 0.8      Neutrophils Absolute 5.54      Immature Granulocytes Absolute, Automated 0.02      Lymphocytes Absolute 2.14      Monocytes Absolute 0.64      Eosinophils Absolute 0.11      Basophils Absolute 0.07     COMPREHENSIVE METABOLIC PANEL - Abnormal    Glucose 94      Sodium 138      Potassium 6.0 (*)     Chloride 108 (*)     Bicarbonate 20 (*)     Anion Gap 16      Urea Nitrogen 11      Creatinine 0.96      eGFR 65      Calcium 9.5      Albumin 4.2      Alkaline Phosphatase 76      Total Protein 7.2      AST 24      Bilirubin, Total 0.7      ALT 7     B-TYPE NATRIURETIC PEPTIDE - Abnormal    BNP 2,029 (*)     Narrative:        <100 pg/mL - Heart failure unlikely  100-299 pg/mL - Intermediate probability of acute heart                  failure exacerbation. Correlate with clinical                  context and patient history.    >=300 pg/mL - Heart Failure likely. Correlate with clinical                  context and  patient history.    BNP testing is performed using different testing methodology at [facility] than at other [facility]. Direct result comparisons should only be made within the same method.      TROPONIN I, HIGH SENSITIVITY - Abnormal    Troponin I, High Sensitivity 56 (*)     Narrative:     Less than 99th percentile of normal range cutoff-  Female and children under 18 years old <14 ng/L; Male <21 ng/L: Negative  Repeat testing should be performed if clinically indicated.     Female and children under 18 years old 14-50 ng/L; Male 21-50 ng/L:  Consistent with possible cardiac damage and possible increased clinical   risk. Serial measurements may help to assess extent of myocardial damage.     >50 ng/L: Consistent with cardiac damage, increased clinical risk and  myocardial infarction. Serial measurements may help assess extent of   myocardial damage.      NOTE: Children less than 1 year old may have higher baseline troponin   levels and results should be interpreted in conjunction with the overall   clinical context.     NOTE: Troponin I testing is performed using a different   testing methodology at [facility] than at other   [facility]. Direct result comparisons should only   be made within the same method.   D-DIMER, VTE EXCLUSION - Abnormal    D-Dimer, Quantitative VTE Exclusion 605 (*)     Narrative:     The VTE Exclusion D-Dimer assay is reported in ng/mL Fibrinogen Equivalent Units (FEU).    Per 's instructions for use, a value of less than 500 ng/mL (FEU) may help to exclude DVT or PE in outpatients when the assay is used with a clinical pretest probability assessment.([facility] must utilize and document eCalc 'Wells Score Deep Vein Thrombosis Risk' for DVT exclusion only. Emergency Department should utilize  Guidelines for Emergency Department Use of the VTE Exclusion D-Dimer and Clinical Pretest probability assessment model for DVT or PE exclusion.)    TROPONIN I, HIGH SENSITIVITY - Abnormal    Troponin I, High Sensitivity 62 (*)     Narrative:     Less than 99th percentile of normal range cutoff-  Female and children under 18 years old <14 ng/L; Male <21 ng/L: Negative  Repeat testing should be performed if clinically indicated.     Female and children under 18 years old 14-50 ng/L; Male 21-50 ng/L:  Consistent with possible cardiac damage and possible increased clinical   risk. Serial measurements may help to assess extent of myocardial damage.     >50 ng/L: Consistent with cardiac damage, increased clinical risk and  myocardial infarction. Serial measurements may help assess extent of   myocardial damage.      NOTE: Children less than 1 year old may have higher baseline troponin   levels and results should be interpreted in conjunction with the overall   clinical context.     NOTE: Troponin I testing is performed using a different   testing methodology at [facility] than at other   [facility]. Direct result comparisons should only   be made within the same method.   POTASSIUM - Normal    Potassium 4.0     TSH WITH REFLEX TO FREE T4 IF ABNORMAL - Normal    Thyroid Stimulating Hormone 1.49      Narrative:     TSH testing is performed using different testing methodology at [facility] than at other [facility]. Direct result comparisons should only be made within the same method.     SARS-COV-2 AND INFLUENZA A/B PCR   RSV PCR         CT angio chest for pulmonary embolism   Final Result   No pulmonary embolism to the segmental level. Limited evaluation of   subsegmental pulmonary arteries, particularly at the lung bases, due   to respiratory motion artifact.        Moderate emphysema.        Diffuse bronchial wall thickening is nonspecific but may be seen with   bronchitis.             MACRO:   None        Signed by: Lilliana Kang 2/29/2024 8:19 PM   Dictation workstation:   [id number]      XR chest 2 views   Final Result    Stable cardiomegaly. No acute pulmonary process.        MACRO:   None        Signed by: Tio Garcia 2/29/2024 5:40 PM   Dictation workstation:   [id number]                    Ericka Coma Scale Score: 15                  Procedure  Procedures    ED Course & MDM   Assessment/Plan:   Chiki Pineda is a 69 y.o. female with a history of COPD, HTN, HLD, CHF (EF 20-25%) s/p PPM and AICD, anxiety, presents with shortness of breath and cough.  She is notably tachycardic, saturating well on room air.  She has an expiratory wheeze on exam, no other adventitious lung sounds.  ECG shows V paced tachycardia, difficult to interpret for ischemia but negative for Sgarbossa criteria and patient without chest pain.  Workup was initiated with labs, chest x-ray.  Differential includes viral infection, pneumonia, COPD exacerbation, CHF exacerbation although grossly patient does not appear volume overloaded.  Treatment was initiated for possible COPD exacerbation with DuoNebs and steroids.  See below for details of ED course and ultimate disposition.    Medications   ipratropium-albuteroL (Duo-Neb) 0.5-2.5 mg/3 mL nebulizer solution 3 mL (3 mL nebulization Given 2/29/24 1900)   methylPREDNISolone sod succinate (SOLU-Medrol) injection 125 mg (125 mg intravenous Given 2/29/24 1819)   iohexol (OMNIPaque) 350 mg iodine/mL solution 75 mL (75 mL intravenous Given 2/29/24 1920)        ED Course as of 02/29/24 2159   Thu Feb 29, 2024   1838 ECG read interpreted by me.  Atrial sensed ventricular paced rhythm, rate 106.  Rightward axis.  Prolonged QTc.  No significant ST or T wave derangements, negative Sgarbossa criteria. [CG]   1847 Initial labs notable for CBC without leukocytosis or anemia, CMP with hemolyzed hyperkalemia 6.0, low bicarb 20, normal renal function, BNP above baseline at 2029, troponin slightly above baseline at 56. [CG]   1847 Chest x-ray shows stable cardiomegaly, no acute pulmonary process. [CG]   1849 D-dimer  borderline elevated at 605, CT PE ordered. [CG]   1932 Troponin stable at 62. Repeat K 4.0. Patient refused viral swabs.  [CG]   2133 Repeat ECG atrial sensed V paced rhythm, rate 95.  Leftward axis.  Prolonged QTc.  Negative Sgarbossa. [CG]   2156 CTPE no PE, shows just chronic emphysema. No pneumonia or significant pulmonary edema. Patient feels better.  She was offered admission for observation but she would prefer to go home.  Her vital signs are completely normal at this time.  She was given prescriptions for 5 days of prednisone and albuterol every 4-6 hours.  Patient was discharged home with anticipatory guidance and strict return precautions. [CG]      ED Course User Index  [CG] Alivia Palomino MD         Diagnoses as of 02/29/24 2159   COPD exacerbation (CMS/[facility])       Disposition:   DISCHARGE.  The patient was discharged with both verbal and written anticipatory guidance and strict return precautions. Discharge diagnosis, instructions and plan were discussed and understood. I emphasized the importance of following up with their primary care provider within 24-48 hours and with any referrals in the timeframe recommended. At the time of discharge the patient was comfortable and was in no apparent distress. Patient is aware of diagnostic uncertainty and was notified though testing is negative here, there is a very small chance that pathology may be missed.  The patient understands these risks and the patient/family understood to call 911 or return immediately to the emergency department if the symptoms worsen or if they have any additional concerns.      FOLLOW UP  Primary care provider in 1-2 days.      ED Prescriptions       Medication Sig Dispense Start Date End Date Auth. Provider    predniSONE (Deltasone) 20 mg tablet Take 2 tablets (40 mg) by mouth once daily for 5 days. 5 tablet 2/29/2024 3/5/2024 Alivia Palomino MD    albuterol 90 mcg/actuation inhaler Inhale 2 puffs every 4 hours if needed  for wheezing. 18 g 2/29/2024 3/30/2024 MD Alivia Russo MD  EM/IM/Peds    This note was dictated by speech recognition. Minor errors in transcription may be present.     Alivia Palomino MD  02/29/24 2158       Alivia Palomino MD  02/29/24 2158

## 2024-03-01 NOTE — DISCHARGE INSTRUCTIONS
You were seen today for shortness of breath.  This is likely a COPD exacerbation.  We gave you a prescription for steroids to take for the next 5 days.  You should use your rescue inhaler (albuterol) and every 4-6 hours for the next few days.  Your evaluation was not concerning for an emergency at this time. Please see the attached information sheet for information about your condition, how to care for your condition at home, and reasons to return to the emergency department. Take any prescriptions written today as prescribed. You should call your primary care provider within 24 hours to tell them about today's visit, including any new medications or medication changes, as he or she may want to see you in the office for further evaluation. If you do not have a primary care provider, call  [phone] for an appointment. We offer in-person office visits as well as virtual options. Please do not hesitate to call  [phone] or return to the emergency department with any new or unresolved concerns or symptoms. Thank you for choosing [facility] for your care.

## 2024-03-02 LAB
ATRIAL RATE: 106 BPM
ATRIAL RATE: 95 BPM
P AXIS: 76 DEGREES
P AXIS: 79 DEGREES
P OFFSET: 194 MS
P OFFSET: 210 MS
P ONSET: 129 MS
P ONSET: 145 MS
PR INTERVAL: 146 MS
PR INTERVAL: 148 MS
Q ONSET: 203 MS
Q ONSET: 218 MS
QRS COUNT: 16 BEATS
QRS COUNT: 17 BEATS
QRS DURATION: 184 MS
QRS DURATION: 188 MS
QT INTERVAL: 414 MS
QT INTERVAL: 436 MS
QTC CALCULATION(BAZETT): 547 MS
QTC CALCULATION(BAZETT): 549 MS
QTC FREDERICIA: 500 MS
QTC FREDERICIA: 507 MS
R AXIS: 154 DEGREES
R AXIS: 159 DEGREES
T AXIS: 13 DEGREES
T AXIS: 14 DEGREES
T OFFSET: 421 MS
T OFFSET: 425 MS
VENTRICULAR RATE: 106 BPM
VENTRICULAR RATE: 95 BPM

## 2024-03-08 ENCOUNTER — PATIENT OUTREACH (OUTPATIENT)
Dept: CARE COORDINATION | Facility: CLINIC | Age: 69
End: 2024-03-08
Payer: MEDICAID

## 2024-03-08 NOTE — PROGRESS NOTES
Discharge Facility: [facility]  Discharge Diagnosis: acute respiratory failure, NSTEMI, influenza A with pneumonia, OSCAR  Admission Date: 3/1/24  Discharge Date: 3/7/24    PCP Appointment Date: no appointment, message to office  Specialist Appointment Date: n/a  Hospital Encounter and Summary: Linked     Unsuccessful attempts to reach patient x2.

## 2024-03-20 ENCOUNTER — PATIENT OUTREACH (OUTPATIENT)
Dept: CARE COORDINATION | Facility: CLINIC | Age: 69
End: 2024-03-20
Payer: MEDICAID

## 2024-03-20 NOTE — PROGRESS NOTES
Unable to reach patient for call back after patient's hospitalization. Patient did not see PCP within 14 days of discharge.  LVM with call back number for patient to call if needed   If no voicemail available call attempts x 2 were made to contact the patient to assist with any questions or concerns patient may have.

## 2024-03-25 ENCOUNTER — HOSPITAL ENCOUNTER (OUTPATIENT)
Dept: RADIOLOGY | Facility: CLINIC | Age: 69
Discharge: HOME | End: 2024-03-25
Payer: MEDICAID

## 2024-03-25 ENCOUNTER — HOSPITAL ENCOUNTER (OUTPATIENT)
Dept: CARDIOLOGY | Facility: HOSPITAL | Age: 69
Discharge: HOME | End: 2024-03-25
Payer: MEDICAID

## 2024-03-25 VITALS — HEIGHT: 66 IN | BODY MASS INDEX: 22.5 KG/M2 | WEIGHT: 139.99 LBS

## 2024-03-25 DIAGNOSIS — I42.9 CARDIOMYOPATHY, UNSPECIFIED TYPE (MULTI): ICD-10-CM

## 2024-03-25 DIAGNOSIS — Z95.810 PRESENCE OF AUTOMATIC CARDIOVERTER/DEFIBRILLATOR (AICD): ICD-10-CM

## 2024-03-25 DIAGNOSIS — Z12.39 ENCOUNTER FOR SCREENING FOR MALIGNANT NEOPLASM OF BREAST, UNSPECIFIED SCREENING MODALITY: ICD-10-CM

## 2024-03-25 PROCEDURE — 93284 PRGRMG EVAL IMPLANTABLE DFB: CPT

## 2024-03-25 PROCEDURE — 93284 PRGRMG EVAL IMPLANTABLE DFB: CPT | Performed by: INTERNAL MEDICINE

## 2024-03-25 PROCEDURE — 77067 SCR MAMMO BI INCL CAD: CPT | Performed by: RADIOLOGY

## 2024-03-25 PROCEDURE — 77063 BREAST TOMOSYNTHESIS BI: CPT | Performed by: RADIOLOGY

## 2024-03-25 PROCEDURE — 77067 SCR MAMMO BI INCL CAD: CPT

## 2024-03-26 ENCOUNTER — OFFICE VISIT (OUTPATIENT)
Dept: PRIMARY CARE | Facility: CLINIC | Age: 69
End: 2024-03-26
Payer: MEDICAID

## 2024-03-26 VITALS
RESPIRATION RATE: 12 BRPM | WEIGHT: 156 LBS | DIASTOLIC BLOOD PRESSURE: 85 MMHG | BODY MASS INDEX: 25.19 KG/M2 | SYSTOLIC BLOOD PRESSURE: 125 MMHG | OXYGEN SATURATION: 96 % | HEART RATE: 84 BPM

## 2024-03-26 DIAGNOSIS — M79.89 SWELLING OF ARM: Primary | ICD-10-CM

## 2024-03-26 DIAGNOSIS — I82.722 CHRONIC DEEP VEIN THROMBOSIS (DVT) OF LEFT UPPER EXTREMITY, UNSPECIFIED VEIN (MULTI): ICD-10-CM

## 2024-03-26 PROBLEM — E44.1 MILD PROTEIN-CALORIE MALNUTRITION (MULTI): Status: ACTIVE | Noted: 2024-03-02

## 2024-03-26 PROBLEM — N17.9 AKI (ACUTE KIDNEY INJURY) (CMS-HCC): Status: ACTIVE | Noted: 2024-03-01

## 2024-03-26 PROBLEM — J10.1 INFLUENZA A: Status: ACTIVE | Noted: 2024-03-05

## 2024-03-26 PROBLEM — I47.10 SUPRAVENTRICULAR TACHYCARDIA (CMS-HCC): Status: ACTIVE | Noted: 2024-03-01

## 2024-03-26 PROBLEM — F14.10 COCAINE ABUSE (MULTI): Status: ACTIVE | Noted: 2024-03-01

## 2024-03-26 PROBLEM — J96.01 ACUTE RESPIRATORY FAILURE WITH HYPOXIA AND HYPERCAPNIA (MULTI): Status: ACTIVE | Noted: 2024-03-01

## 2024-03-26 PROBLEM — E87.20 LACTIC ACID INCREASED: Status: ACTIVE | Noted: 2024-03-01

## 2024-03-26 PROBLEM — J96.02 ACUTE RESPIRATORY FAILURE WITH HYPOXIA AND HYPERCAPNIA (MULTI): Status: ACTIVE | Noted: 2024-03-01

## 2024-03-26 PROBLEM — J09.X1 INFLUENZA A WITH PNEUMONIA: Status: ACTIVE | Noted: 2024-03-01

## 2024-03-26 PROBLEM — I82.90 DEEP VEIN THROMBOSIS (DVT) OF NON-EXTREMITY VEIN: Status: ACTIVE | Noted: 2024-03-26

## 2024-03-26 PROBLEM — F41.1 GENERALIZED ANXIETY DISORDER: Status: ACTIVE | Noted: 2024-01-23

## 2024-03-26 PROBLEM — R73.9 HYPERGLYCEMIA: Status: ACTIVE | Noted: 2024-03-01

## 2024-03-26 PROCEDURE — 1159F MED LIST DOCD IN RCRD: CPT | Performed by: INTERNAL MEDICINE

## 2024-03-26 PROCEDURE — 3074F SYST BP LT 130 MM HG: CPT | Performed by: INTERNAL MEDICINE

## 2024-03-26 PROCEDURE — 3079F DIAST BP 80-89 MM HG: CPT | Performed by: INTERNAL MEDICINE

## 2024-03-26 PROCEDURE — 99496 TRANSJ CARE MGMT HIGH F2F 7D: CPT | Performed by: INTERNAL MEDICINE

## 2024-03-26 RX ORDER — THIAMINE HCL 100 MG
100 TABLET ORAL 3 TIMES DAILY
COMMUNITY
Start: 2024-03-07

## 2024-03-26 RX ORDER — FOLIC ACID 1 MG/1
1 TABLET ORAL DAILY
COMMUNITY
Start: 2024-03-07

## 2024-03-26 NOTE — PROGRESS NOTES
Subjective   Chief complaint: Chiki Pineda is a 69 y.o. female who presents for Hospital Follow-up (Pt is here for hospital follow up for trouble breathing. Today she is still having some sob. ).    HPI:  HPI  The patient was admitted earlier this month for acute hypoxic respiratory failure with hypercarbia 2/2 influenza pneumonia.   The patient was see in the ED on 3/24 for 2 weeks prior of L hand and arm swelling. Had been diagnosed with extensive DVT of LUE and placed on Eliquis. CT showed no acute PE. Advised to use warm compresses and tylenol for the discomfort.     Since discharge, she still has some SOB with exertion, better when she stays sitting. Patient is concerned about her blood clots in relationship to her brain aneurysms. Additionally, has questions about medications she was placed on since discharge from [facility].     Objective   /85   Pulse 84   Resp 12   Wt 70.8 kg (156 lb)   SpO2 96%   BMI 25.19 kg/m²   Physical Exam  Constitutional:       General: She is not in acute distress.     Appearance: Normal appearance. She is not diaphoretic.   Cardiovascular:      Rate and Rhythm: Normal rate and regular rhythm.      Pulses: Normal pulses.      Heart sounds: Normal heart sounds. No murmur heard.     Comments: Radial pulses 2+ and symmetric.   Pulmonary:      Effort: Pulmonary effort is normal. No respiratory distress.      Breath sounds: Normal breath sounds. No wheezing, rhonchi or rales.   Abdominal:      General: Bowel sounds are normal.      Palpations: Abdomen is soft.      Tenderness: There is no abdominal tenderness.   Musculoskeletal:         General: Swelling (LUE) present. Normal range of motion.      Right lower leg: No edema.      Left lower leg: No edema.   Skin:     General: Skin is warm and dry.      Capillary Refill: Capillary refill takes less than 2 seconds.      Comments: Capillary refill of BUE < 2 seconds.    Neurological:      General: No focal deficit present.       Mental Status: She is alert and oriented to person, place, and time. Mental status is at baseline.         I have reviewed and reconciled the medication list with the patient today.   Current Outpatient Medications:     folic acid (Folvite) 1 mg tablet, Take 1 tablet (1 mg) by mouth once daily., Disp: , Rfl:     Vitamin B-1 100 mg tablet, Take 1 tablet (100 mg) by mouth 3 times a day., Disp: , Rfl:     albuterol 90 mcg/actuation inhaler, Inhale 2 puffs every 4 hours if needed for wheezing., Disp: 18 g, Rfl: 0    [START ON 4/19/2024] apixaban (Eliquis) 5 mg tablet, Take 1 tablet (5 mg) by mouth twice a day. Do not start before April 19, 2024., Disp: , Rfl:     aspirin 81 mg chewable tablet, CHEW AND SWALLOW 1 TABLET BY MOUTH EVERY DAY, Disp: 30 tablet, Rfl: 10    atorvastatin (Lipitor) 40 mg tablet, TAKE 1 TABLET BY MOUTH ONCE DAILY, Disp: 30 tablet, Rfl: 10    buPROPion XL (Wellbutrin XL) 300 mg 24 hr tablet, TAKE 1 TABLET BY MOUTH DAILY, Disp: 30 tablet, Rfl: 10    busPIRone (Buspar) 5 mg tablet, TAKE 1 TABLET BY MOUTH TWICE DAILY, Disp: 60 tablet, Rfl: 10    carbamide peroxide (Debrox) 6.5 % otic solution, Administer 5 drops into each ear 2 times a day., Disp: , Rfl:     carvedilol (Coreg) 25 mg tablet, TAKE 1 TABLET BY MOUTH TWICE DAILY WITH MEALS, Disp: 60 tablet, Rfl: 10    digoxin (Lanoxin) 125 MCG tablet, TAKE 1 TABLET BY MOUTH ONCE DAILY, Disp: 30 tablet, Rfl: 10    fluticasone propion-salmeteroL (Advair Diskus) 250-50 mcg/dose diskus inhaler, Inhale 1 puff every 12 hours., Disp: , Rfl:     furosemide (Lasix) 40 mg tablet, TAKE 1 TABLET BY MOUTH ONCE DAILY, Disp: 30 tablet, Rfl: 10    hydrALAZINE (Apresoline) 50 mg tablet, TAKE ONE (1) TABLET BY MOUTH TWICE DAILY, Disp: 60 tablet, Rfl: 10    ipratropium-albuteroL (Duo-Neb) 0.5-2.5 mg/3 mL nebulizer solution, Take 3 mL by nebulization 4 times a day as needed for wheezing or shortness of breath., Disp: 90 mL, Rfl: 11    lisinopril 40 mg tablet, TAKE 1 TABLET  BY MOUTH DAILY, Disp: 30 tablet, Rfl: 10    mirtazapine (Remeron) 15 mg tablet, TAKE 1 TABLET BY MOUTH EVERY NIGHT AT BEDTIME, Disp: 30 tablet, Rfl: 10    multivitamin tablet, TAKE 1 TABLET BY MOUTH ONCE DAILY, Disp: 30 tablet, Rfl: 10    nebulizer accessories (Adult Aerosol Mask) misc, 1 each 3 times a day., Disp: 2 each, Rfl: 2    nitroglycerin (Nitrostat) 0.4 mg SL tablet, DISSOLVE 1 TABLET UNDER THE TONGUE AS NEEDED FOR CHEST PAIN EVERY 5 MINUTES UP TO 3 TIMES. IF NO RELIEF CALL 911., Disp: 25 tablet, Rfl: 10    Spiriva with HandiHaler 18 mcg inhalation capsule, INHALE THE CONTENTS OF ONE (1) CAPSULE BY MOUTH VIA HANDIHALER ONCE DAILY AS DIRECTED. DO NOT SWALLOW CAPSULE, Disp: 30 capsule, Rfl: 10    spironolactone (Aldactone) 25 mg tablet, TAKE 1 TABLET BY MOUTH TWICE DAILY, Disp: 60 tablet, Rfl: 10    Vitamin D3 25 mcg (1,000 unit) capsule, TAKE 1 CAPSULE BY MOUTH ONCE DAILY, Disp: 30 capsule, Rfl: 10  No current facility-administered medications for this visit.     Imaging:  CT angio chest w and wo IV contrast    Result Date: 3/24/2024  * * *Final Report* * * DATE OF EXAM: Mar 24 2024 12:00PM   [facility], located within [facility]   0540  -  CT CHEST W IVCON PE  / ACCESSION #  [id number] PROCEDURE REASON: Pulmonary embolism (PE) suspected, high prob      * * * * Physician Interpretation * * * * RESULT: EXAMINATION:  CHEST CT WITH CONTRAST (PULMONARY EMBOLISM PROTOCOL) CLINICAL HISTORY: Suspected PE Technique:  Spiral CT acquisition of the chest from the thoracic inlet to the upper abdomen following IV contrast.  Axial 1 and 3 mm thick slices plus coronal and sagittal reformatted images. MQ:  CTCP_5 Contrast:  76 mL Omnipaque 350 IV CT Radiation dose: Integrated Dose-length product (DLP) for this visit =   558 mGy*cm CT Dose Reduction Employed: Automated exposure control(AEC) and iterative recon Comparison:  6/13/2023 RESULT: Limitations:  None. Evaluation for thromboembolic disease:      - Right heart chambers:  No thromboembolic disease.       - Main pulmonary arteries:  No thromboembolic disease.      - Lobar pulmonary arteries:  No thromboembolic disease.      - Segmental pulmonary arteries:  No thromboembolic disease.      - Subsegmental pulmonary arteries:  No thromboembolic disease.      - Additional pulmonary artery findings:  The main pulmonary artery is normal in caliber. Lines, tubes, and devices:  Pacer present via the left  side. Lung parenchyma and airways: Emphysema. Tree-in-bud type opacity in the lateral left upper lobe, largest nodule 3 mm (6:88, 86). This is not previously seen stable small calcified left lower lung nodule (6:94). Small airway wall thickening in the lungs. No discrete central airway abnormality. Pleural space:  No pleural effusion.  No pleural thickening. Lower neck, lymph nodes, and mediastinum:  The imaged thyroid gland is normal. Few normal-sized right paratracheal lymph nodes. A slightly enlarged 1.6 cm short axis dimension precarinal lymph node (5:125). This is stable. 2.0 x 1.1 cm lymph node aggregate in the right hilum is suboptimally compared due to lack of contrast from the previous study. Slightly enlarged 1.4 cm short axis dimension subcarinal lymph node measures slightly larger since the previous. There is another 1.5 cm short axis dimension right subcarinal lymph node (5:146). Few stable normal-sized left prevascular lymph nodes. Borderline sized 1.1 cm short axis dimension left hilar lymph node (525) cannot be well compared to the previous study. Heart, pericardium, and thoracic vessels: Suboptimal visualization of the aorta due to lack of opacification. Atherosclerosis of the thoracic aorta. No aneurysmal dilatation. No pericardial effusion. Enlarged left ventricle is not significantly changed. . Bones and soft tissues:  No destructive bone lesion. Chest wall is unremarkable. Upper abdomen:  Mild reflux of contrast in the IVC and hepatic veins can reflect elevated right heart pressure. Stable too small to  characterize hepatic hypodensity, likely cysts  (topogram) images: Unremarkable.    IMPRESSION: No CT evidence of pulmonary embolism. Emphysema. Small airway wall thickening in the lungs. New small area of tree-in-bud type opacity in the lateral left upper lobe with largest nodule 3 mm. Inflammatory/infective etiology is favored. Few slightly enlarged mediastinal lymph nodes. Couple of subcarinal lymph nodes have intervally enlarged as compared to the previous study Enlarged left ventricle. Reflux of contrast in the IVC suggesting elevated right heart pressure Other findings in the result section. Transcribed Using Voice Recognition Transcribe Date/Time: Mar 24 2024 12:30P Dictated by: HUMBERTO JIANG MD This examination was interpreted and the report reviewed and electronically signed by: HUMBERTO JIANG MD on Mar 24 2024 12:56PM  EST    Vascular US upper extremity venous duplex left    Result Date: 3/20/2024  * * *Final Report* * * DATE OF EXAM: Mar 20 2024  2:47PM   MMU   1003  -  US DVT UPPER LT  / ACCESSION #  [id number] PROCEDURE REASON: Arm deep vein thrombosis (DVT), new symptoms      * * * * Physician Interpretation * * * *  EXAMINATION:  LEFT UPPER EXTREMITY DEEP VENOUS ULTRASOUND WITH DOPPLER IMAGING CLINICAL HISTORY: left arm swelling, pain TECHNIQUE:  Grayscale with compression maneuvers where accessible, color and spectral Doppler of the left internal jugular, subclavian, and axillary veins was performed.  Grayscale with compression maneuvers of the left brachial, basilic and cephalic veins was also performed. The contralateral internal jugular and distal subclavian veins were imaged for comparison. Images were obtained and stored in a permanent archive. MQ:   USUEL_1 COMPARISON: None RESULT: LEFT UPPER EXTREMITY DEEP VEINS Internal Jugular vein: Abnormal, incomplete compression, partial flow present. Subclavian vein:  Normal, spontaneous flow. Axillary vein:  Normal compression, normal spontaneous  flow. Brachial vein:  Normal compression SUPERFICIAL VEINS Basilic vein: Normal compression. Cephalic vein:  Normal compression. Other: Thrombosed superficial vein dorsal distal forearm/wrist at the site of previous IV. RIGHT UPPER EXTREMITY (FOR COMPARISON) DEEP VEINS Internal Jugular and Distal Subclavian veins: Normal compression, normal spontaneous flow.    IMPRESSION: Positive study for chronic DVT in the left upper extremity: Partially compressible clot in the internal jugular vein. Positive study for superficial thrombophlebitis in the imaged segments of the left upper extremity: Previous IV site along the dorsal distal forearm/wrist. URGENT RESULTS Acuity: Urgent Communication: Sonographer communicated with KIARA YOO on   3/20/2024 2:45 PM  via verbal communication. --END OF FINDING-- : LINDA   Transcribe Date/Time: Mar 20 2024  3:34P Dictated by : EUSEBIA REEDER MD This examination was interpreted and the report reviewed and electronically signed by: EUSEBIA REEDER MD on Mar 20 2024  3:38PM  EST    ECG 12 lead    Result Date: 3/2/2024  Atrial-sensed ventricular-paced rhythm Abnormal ECG When compared with ECG of 29-FEB-2024 17:14, (unconfirmed) Vent. rate has decreased BY  11 BPM See ED provider note for full interpretation and clinical correlation Confirmed by Vanessa Garza ([id number]) on 3/2/2024 8:07:11 PM    ECG 12 lead    Result Date: 3/2/2024  Atrial-sensed ventricular-paced rhythm Abnormal ECG When compared with ECG of 05-DEC-2022 11:52, Previous ECG has undetermined rhythm, needs review See ED provider note for full interpretation and clinical correlation Confirmed by Vanessa Garza ([id number]) on 3/2/2024 8:03:44 PM    XR abdomen 2 views supine and erect or decub    Result Date: 3/1/2024  * * *Final Report* * * DATE OF EXAM: Mar  1 2024  7:46PM   HCX   5289  -  XR ABDOMEN 1V SUPINE  / ACCESSION #  [id number] PROCEDURE REASON: Evaluate tube, line or lead position      * *  * * Physician Interpretation * * * * RESULT: Exam: Portable upright AP abdomen from 7:26 PM for tube placement compared with supine view from 3:25 PM    IMPRESSION: The port and tip of the enteric tube are beyond the gastroesophageal junction in the LEFT upper quadrant region of the stomach with the tip directed caudad. Transcribed Using Voice Recognition Transcribe Date/Time: Mar  1 2024  8:17P Dictated by: MICAH ROSAS MD This examination was interpreted and the report reviewed and electronically signed by: MICAH ROSAS MD on Mar  1 2024  8:18PM  EST    XR chest 1 view    Result Date: 3/1/2024  * * *Final Report* * * DATE OF EXAM: Mar  1 2024  7:46PM   HCX   5376  -  XR CHEST 1V FRONTAL PORT  / ACCESSION #  [id number] PROCEDURE REASON: Shortness of breath      * * * * Physician Interpretation * * * * RESULT: EXAMINATION:  CHEST RADIOGRAPH (PORTABLE SINGLE VIEW AP) Exam Date/Time:  3/1/2024 7:46 PM CLINICAL HISTORY: Shortness of breath MQ:  XCPR_5 Comparison:  03/01/2024 RESULT: Lines, tubes, and devices:  AICD.  Endotracheal tube and nasogastric tube. Lungs and pleura:  Airspace opacity in the left upper lobe and interstitial prominence in both lungs.  Small left pleural effusion suggested.  No pneumothorax seen Cardiomediastinal silhouette:  Stable cardiomediastinal silhouette. Other:  .    IMPRESSION: Left upper lobe airspace process.  Retrocardiac effusion.  Endotracheal tube appears appropriate in positioning Transcribed Using Voice Recognition Transcribe Date/Time: Mar  1 2024  8:16P Dictated by: TAMAR PRINCE MD  This examination was interpreted and the report reviewed and electronically signed by: TAMAR PRINCE MD on Mar  1 2024  8:18PM  EST    XR abdomen 2 views supine and erect or decub    Result Date: 3/1/2024  * * *Final Report* * * DATE OF EXAM: Mar  1 2024  3:51PM   MMX   5289  -  XR ABDOMEN 1V SUPINE  / ACCESSION #  [id number] PROCEDURE REASON: Evaluate tube, line or lead position      * * * * Physician  Interpretation * * * *  Clinical Information: Nasogastric tube Portable supine view of the abdomen was obtained. Nasogastric tube tip within the proximal gastric body. There is a nonspecific, nonobstructive bowel gas pattern. No abnormal abdominal masses are identified.  No pathologic calcifications. No acute bony abnormalities are seen.    IMPRESSION: Nonspecific, nonobstructive bowel gas pattern. Nasogastric tube tip within the proximal gastric body. : [facility]   Transcribe Date/Time: Mar  1 2024  4:01P Dictated by : MAK BONE MD This examination was interpreted and the report reviewed and electronically signed by: MAK BONE MD on Mar  1 2024  4:01PM  EST    XR chest 1 view    Result Date: 3/1/2024  * * *Final Report* * * DATE OF EXAM: Mar  1 2024  3:53PM   MMX   5376  -  XR CHEST 1V FRONTAL PORT  / ACCESSION #  [id number] PROCEDURE REASON: Shortness of breath      * * * * Physician Interpretation * * * *  EXAMINATION:  CHEST RADIOGRAPH (PORTABLE SINGLE VIEW AP) Exam Date/Time:  3/1/2024 3:53 PM Clinical History:  Shortness of breath Comparison:  Prior study from earlier the same day RESULT: LINES, TUBES, AND DEVICES:  Endotracheal tube tip 3 to 4 cm proximal to the mallory.  Nasogastric tube tip beyond the inferior extent of the image. Cardiac pacemaker with leads unchanged in position. CARDIOMEDIASTINAL SILHOUETTE:  Stable enlarged cardiac silhouette. LUNGS AND PLEURA: Consolidation within the left midlung, likely present previously but obscured by overlying cardiac pacemaker.  No pleural effusion. No pulmonary vascular congestion. No pneumothorax identified. OTHER:  N/A    IMPRESSION: Interval endotracheal and nasogastric tubes. Consolidation within the left midlung zone, suspect area of pneumonia, area obscured on the prior study by overlying cardiac pacemaker. : [facility]   Transcribe Date/Time: Mar  1 2024  3:56P Dictated by : MAK BONE MD This examination was interpreted  and the report reviewed and electronically signed by: MAK BONE MD on Mar  1 2024  4:00PM  EST    XR chest 1 view    Result Date: 3/1/2024  * * *Final Report* * * DATE OF EXAM: Mar  1 2024  3:05PM   MMX   5376  -  XR CHEST 1V FRONTAL PORT  / ACCESSION #  [id number] PROCEDURE REASON: Shortness of breath      * * * * Physician Interpretation * * * *  EXAMINATION:  CHEST RADIOGRAPH (PORTABLE SINGLE VIEW AP) Exam Date/Time:  3/1/2024 3:05 PM Clinical History:  Shortness of breath Comparison:  05/18/2021 RESULT: LINES, TUBES, AND DEVICES:  Cardiac pacemaker with leads unchanged in position. CARDIOMEDIASTINAL SILHOUETTE:  Stable enlarged cardiac silhouette. LUNGS AND PLEURA: No consolidation.  No pleural effusion. No pulmonary vascular congestion. No pneumothorax identified. OTHER:  N/A    IMPRESSION: No acute cardiopulmonary process. : [facility]   Transcribe Date/Time: Mar  1 2024  3:15P Dictated by : MAK BONE MD This examination was interpreted and the report reviewed and electronically signed by: MAK BONE MD on Mar  1 2024  3:17PM  EST    CT angio chest for pulmonary embolism    Result Date: 2/29/2024  Interpreted By:  Lilliana Kang, STUDY: CT ANGIO CHEST FOR PULMONARY EMBOLISM;  2/29/2024 7:36 pm   INDICATION: Signs/Symptoms:SOB, tachycardia,+Dimer.   COMPARISON: 11/18/2021   ACCESSION NUMBER(S): [id number]   ORDERING CLINICIAN: VENKAT MACDONALD   TECHNIQUE: Axial CT images of the chest obtained  after intravenous administration of contrast. Maximum intensity projection images were created and reviewed. Dual energy reconstructions of the chest were performed including low energy mono androgenic images and iodine density maps.   FINDINGS: VESSELS: No aortic aneurysm. No pulmonary embolism to the segmental level. Limited evaluation of the subsegmental pulmonary arteries, particularly at the lung bases, due to motion artifact. HEART: Stable cardiomegaly, particularly enlargement of the left  ventricle. Pacemaker leads noted in right atrium, right ventricle and coronary sinus. Reflux of contrast into the hepatic veins may be seen with right heart insufficiency.  No pericardial effusion. MEDIASTINUM AND LEO: Stable enlarged mediastinal lymph nodes measuring up to 1.5 cm in short axis. LUNG, PLEURA, AND LARGE AIRWAYS: No pleural effusion or pneumothorax. No pulmonary consolidation or suspicious pulmonary nodule. Moderate emphysema. Diffuse bronchial wall thickening. CHEST WALL AND LOWER NECK: Within normal limits.   UPPER ABDOMEN: No acute abnormality of the visualized abdomen. Partially imaged presumed cyst in the upper pole left kidney   BONES: No acute osseous abnormality.       No pulmonary embolism to the segmental level. Limited evaluation of subsegmental pulmonary arteries, particularly at the lung bases, due to respiratory motion artifact.   Moderate emphysema.   Diffuse bronchial wall thickening is nonspecific but may be seen with bronchitis.     MACRO: None   Signed by: Lilliana Kang 2/29/2024 8:19 PM Dictation workstation:   [id number]    XR chest 2 views    Result Date: 2/29/2024  Interpreted By:  Tio Garcia, STUDY: XR CHEST 2 VIEWS;  2/29/2024 5:31 pm   INDICATION: Signs/Symptoms:SOB.   COMPARISON: Chest x-ray 12/05/2022   ACCESSION NUMBER(S): [id number]   ORDERING CLINICIAN: DYLLAN JIMENES   FINDINGS: Left-sided triple lead AICD is stable in position.   CARDIOMEDIASTINAL SILHOUETTE: Silhouette is enlarged but stable. Atherosclerotic calcification of the aorta.   LUNGS: Mild interstitial prominence. There is no consolidation, effusion or pneumothorax.   ABDOMEN: No remarkable upper abdominal findings.   BONES: No acute osseous abnormality.       Stable cardiomegaly. No acute pulmonary process.   MACRO: None   Signed by: Tio Garcia 2/29/2024 5:40 PM Dictation workstation:   [id number]       Labs reviewed:    Lab Results   Component Value Date    WBC 8.5 02/29/2024    HGB 15.4  02/29/2024    HCT 48.0 (H) 02/29/2024     02/29/2024    CHOL 119 01/16/2024    TRIG 54 01/16/2024    HDL 51.7 01/16/2024    ALT 7 02/29/2024    AST 24 02/29/2024     02/29/2024    K 4.0 02/29/2024     (H) 02/29/2024    CREATININE 0.96 02/29/2024    BUN 11 02/29/2024    CO2 20 (L) 02/29/2024    TSH 1.49 02/29/2024    INR 1.1 07/02/2022    HGBA1C 5.8 (H) 03/02/2024       Assessment/Plan   Problem List Items Addressed This Visit          Coag and Thromboembolic    Chronic deep vein thrombosis (DVT) of left upper extremity (CMS/[facility])     Vascular US showed: Positive study for chronic DVT in the left upper extremity: Partially compressible clot in the internal jugular vein.     Positive study for superficial thrombophlebitis in the imaged segments of   the left upper extremity: Previous IV site along the dorsal distal   forearm/wrist.     Patient is taking Eliquis 5 mg BID.             Musculoskeletal and Injuries    Swelling of arm - Primary     Secondary to DVT and thrombophlebitis.   Discussed using arm compression stockings, which have been prescribed to her.   Discussed patient needs to elevate her arms.             Continue current medications as listed  Follow up in 3 months for repeat CBC, CMP, and lipids.

## 2024-03-26 NOTE — ASSESSMENT & PLAN NOTE
Vascular US showed: Positive study for chronic DVT in the left upper extremity: Partially compressible clot in the internal jugular vein.     Positive study for superficial thrombophlebitis in the imaged segments of   the left upper extremity: Previous IV site along the dorsal distal   forearm/wrist.     Patient is taking Eliquis 5 mg BID.     Will plan to repeat US in 5-6 months, however, will hold off on order until next visit in 3 months for evaluation.

## 2024-03-26 NOTE — ASSESSMENT & PLAN NOTE
Secondary to DVT and thrombophlebitis.   Discussed using arm compression stockings, which have been prescribed to her.   Discussed patient needs to elevate her arms.

## 2024-03-28 DIAGNOSIS — M79.89 SWELLING OF ARM: ICD-10-CM

## 2024-04-09 ENCOUNTER — APPOINTMENT (OUTPATIENT)
Dept: RADIOLOGY | Facility: CLINIC | Age: 69
End: 2024-04-09
Payer: MEDICAID

## 2024-04-10 ENCOUNTER — PATIENT OUTREACH (OUTPATIENT)
Dept: CARE COORDINATION | Facility: CLINIC | Age: 69
End: 2024-04-10
Payer: MEDICAID

## 2024-04-10 RX ORDER — PREDNISONE 10 MG/1
10 TABLET ORAL DAILY
COMMUNITY
End: 2024-04-23

## 2024-04-10 NOTE — PROGRESS NOTES
Discharge Facility: [facility]  Discharge Diagnosis: COPD exacerbation  Admission Date: 4/8/24  Discharge Date: 4/9/24    PCP Appointment Date: 4/19/24  Specialist Appointment Date: n/a  Hospital Encounter and Summary: Linked  See discharge assessment below for further details    Engagement  Call Start Time: 1533 (4/10/2024  3:32 PM)    Medications  Medications reviewed with patient/caregiver?: Yes (4/10/2024  3:32 PM)  Is the patient having any side effects they believe may be caused by any medication additions or changes?: No (4/10/2024  3:32 PM)  Does the patient have all medications ordered at discharge?: Yes (4/10/2024  3:32 PM)  Medication Comments: new/changed medication - predniSONE (DELTASONE) 10 mg tablet  Take 4 tablets by mouth once daily for 3 days, THEN 3 tablets once daily for 3 days, THEN 2 tablets once daily for 3 days, THEN 1 tablet once daily for 3 days. (4/10/2024  3:32 PM)    Appointments  Does the patient have a primary care provider?: Yes (4/10/2024  3:32 PM)  Care Management Interventions: Verified appointment date/time/provider (4/10/2024  3:32 PM)    Self Management  What is the home health agency?: n/a declined, patient has private home health aide (4/10/2024  3:32 PM)  What Durable Medical Equipment (DME) was ordered?: n/a (4/10/2024  3:32 PM)    Patient Teaching  Care Management Interventions: Reviewed instructions with patient (4/10/2024  3:32 PM)  What is the patient's perception of their health status since discharge?: Same (4/10/2024  3:32 PM)  Patient/Caregiver Education Comments: patient reports her breathing is about the same. patient reports compliance with medications. patient is eager to see Dr. Flynn, appointment scheduled. (4/10/2024  3:32 PM)

## 2024-04-19 ENCOUNTER — OFFICE VISIT (OUTPATIENT)
Dept: PRIMARY CARE | Facility: CLINIC | Age: 69
End: 2024-04-19
Payer: MEDICAID

## 2024-04-19 VITALS
HEART RATE: 98 BPM | BODY MASS INDEX: 26.16 KG/M2 | DIASTOLIC BLOOD PRESSURE: 84 MMHG | SYSTOLIC BLOOD PRESSURE: 138 MMHG | WEIGHT: 162 LBS | OXYGEN SATURATION: 98 % | RESPIRATION RATE: 14 BRPM

## 2024-04-19 DIAGNOSIS — E78.5 HYPERLIPIDEMIA, UNSPECIFIED HYPERLIPIDEMIA TYPE: Primary | ICD-10-CM

## 2024-04-19 DIAGNOSIS — F17.200 NICOTINE USE DISORDER: ICD-10-CM

## 2024-04-19 DIAGNOSIS — I82.722 CHRONIC DEEP VEIN THROMBOSIS (DVT) OF LEFT UPPER EXTREMITY, UNSPECIFIED VEIN (MULTI): ICD-10-CM

## 2024-04-19 DIAGNOSIS — J43.9 PULMONARY EMPHYSEMA, UNSPECIFIED EMPHYSEMA TYPE (MULTI): ICD-10-CM

## 2024-04-19 PROCEDURE — 3075F SYST BP GE 130 - 139MM HG: CPT | Performed by: INTERNAL MEDICINE

## 2024-04-19 PROCEDURE — 3079F DIAST BP 80-89 MM HG: CPT | Performed by: INTERNAL MEDICINE

## 2024-04-19 PROCEDURE — 99496 TRANSJ CARE MGMT HIGH F2F 7D: CPT | Performed by: INTERNAL MEDICINE

## 2024-04-19 PROCEDURE — 1159F MED LIST DOCD IN RCRD: CPT | Performed by: INTERNAL MEDICINE

## 2024-04-19 RX ORDER — MICONAZOLE NITRATE 2 %
2 CREAM (GRAM) TOPICAL AS NEEDED
Qty: 100 EACH | Refills: 0 | Status: SHIPPED | OUTPATIENT
Start: 2024-04-19 | End: 2024-04-25 | Stop reason: SDUPTHER

## 2024-04-19 RX ORDER — PREDNISONE 10 MG/1
TABLET ORAL DAILY
Qty: 30 TABLET | Refills: 0 | Status: SHIPPED | OUTPATIENT
Start: 2024-04-19 | End: 2024-05-01

## 2024-04-19 NOTE — PROGRESS NOTES
Subjective   Chief complaint: Chiki Pineda is a 69 y.o. female who presents for Hospital Follow-up (Pt is here for hospital follow up for COPD , trouble breathing. ).    HPI:  69 y.o. female reports to the office for follow up of general wellness. PMHx includes COPD, HTN. Pt recently hospitalized for COPD exacerbation. Notes no improvement to breathing with current treatments. Feeling otherwise well. No recent illness.         Objective   /84   Pulse 98   Resp 14   Wt 73.5 kg (162 lb)   SpO2 98%   BMI 26.16 kg/m²   Physical Exam  Constitutional:       Appearance: Normal appearance.   HENT:      Head: Normocephalic and atraumatic.      Mouth/Throat:      Mouth: Mucous membranes are moist.      Pharynx: Oropharynx is clear.   Eyes:      Pupils: Pupils are equal, round, and reactive to light.   Cardiovascular:      Rate and Rhythm: Normal rate and regular rhythm.   Pulmonary:      Effort: Pulmonary effort is normal. No respiratory distress.   Abdominal:      General: Abdomen is flat. There is no distension.   Musculoskeletal:         General: Normal range of motion.      Cervical back: Normal range of motion.   Skin:     General: Skin is warm and dry.   Neurological:      General: No focal deficit present.      Mental Status: She is alert.         I have reviewed and reconciled the medication list with the patient today.   Current Outpatient Medications:     albuterol 90 mcg/actuation inhaler, Inhale 2 puffs every 4 hours if needed for wheezing., Disp: 18 g, Rfl: 0    apixaban (Eliquis) 5 mg tablet, Take 1 tablet (5 mg) by mouth twice a day., Disp: , Rfl:     aspirin 81 mg chewable tablet, CHEW AND SWALLOW 1 TABLET BY MOUTH EVERY DAY, Disp: 30 tablet, Rfl: 10    atorvastatin (Lipitor) 40 mg tablet, TAKE 1 TABLET BY MOUTH ONCE DAILY, Disp: 30 tablet, Rfl: 10    buPROPion XL (Wellbutrin XL) 300 mg 24 hr tablet, TAKE 1 TABLET BY MOUTH DAILY, Disp: 30 tablet, Rfl: 10    busPIRone (Buspar) 5 mg tablet, TAKE 1  TABLET BY MOUTH TWICE DAILY (Patient not taking: Reported on 4/19/2024), Disp: 60 tablet, Rfl: 10    carbamide peroxide (Debrox) 6.5 % otic solution, Administer 5 drops into each ear 2 times a day., Disp: , Rfl:     carvedilol (Coreg) 25 mg tablet, TAKE 1 TABLET BY MOUTH TWICE DAILY WITH MEALS, Disp: 60 tablet, Rfl: 10    digoxin (Lanoxin) 125 MCG tablet, TAKE 1 TABLET BY MOUTH ONCE DAILY, Disp: 30 tablet, Rfl: 10    fluticasone propion-salmeteroL (Advair Diskus) 250-50 mcg/dose diskus inhaler, Inhale 1 puff every 12 hours., Disp: , Rfl:     folic acid (Folvite) 1 mg tablet, Take 1 tablet (1 mg) by mouth once daily., Disp: , Rfl:     furosemide (Lasix) 40 mg tablet, TAKE 1 TABLET BY MOUTH ONCE DAILY, Disp: 30 tablet, Rfl: 10    hydrALAZINE (Apresoline) 50 mg tablet, TAKE ONE (1) TABLET BY MOUTH TWICE DAILY, Disp: 60 tablet, Rfl: 10    ipratropium-albuteroL (Duo-Neb) 0.5-2.5 mg/3 mL nebulizer solution, Take 3 mL by nebulization 4 times a day as needed for wheezing or shortness of breath., Disp: 90 mL, Rfl: 11    lisinopril 40 mg tablet, TAKE 1 TABLET BY MOUTH DAILY, Disp: 30 tablet, Rfl: 10    mirtazapine (Remeron) 15 mg tablet, TAKE 1 TABLET BY MOUTH EVERY NIGHT AT BEDTIME, Disp: 30 tablet, Rfl: 10    multivitamin tablet, TAKE 1 TABLET BY MOUTH ONCE DAILY, Disp: 30 tablet, Rfl: 10    nebulizer accessories (Adult Aerosol Mask) misc, 1 each 3 times a day., Disp: 2 each, Rfl: 2    nitroglycerin (Nitrostat) 0.4 mg SL tablet, DISSOLVE 1 TABLET UNDER THE TONGUE AS NEEDED FOR CHEST PAIN EVERY 5 MINUTES UP TO 3 TIMES. IF NO RELIEF CALL 911., Disp: 25 tablet, Rfl: 10    predniSONE (Deltasone) 10 mg tablet, Take 1 tablet (10 mg) by mouth once daily. Take 4 tablets by mouth once daily for 3 days, THEN 3 tablets once daily for 3 days, THEN 2 tablets once daily for 3 days, THEN 1 tablet once daily for 3 days, Disp: , Rfl:     Spiriva with HandiHaler 18 mcg inhalation capsule, INHALE THE CONTENTS OF ONE (1) CAPSULE BY MOUTH VIA  HANDIHALER ONCE DAILY AS DIRECTED. DO NOT SWALLOW CAPSULE, Disp: 30 capsule, Rfl: 10    spironolactone (Aldactone) 25 mg tablet, TAKE 1 TABLET BY MOUTH TWICE DAILY, Disp: 60 tablet, Rfl: 10    Vitamin B-1 100 mg tablet, Take 1 tablet (100 mg) by mouth 3 times a day., Disp: , Rfl:     Vitamin D3 25 mcg (1,000 unit) capsule, TAKE 1 CAPSULE BY MOUTH ONCE DAILY, Disp: 30 capsule, Rfl: 10     Imaging:  CT chest wo IV contrast    Result Date: 4/8/2024  * * *Final Report* * * DATE OF EXAM: Apr 8 2024  3:50PM   [facility]   0541  -  CT CHEST WO IVCON  / ACCESSION #  [id number] PROCEDURE REASON: Interstitial lung disease      * * * * Physician Interpretation * * * *  EXAMINATION:  CHEST CT WITHOUT CONTRAST Indication:  Interstitial lung disease, fatigue and malaise, shortness of breath Technique:  Spiral CT acquisition of the chest from the thoracic inlet to the upper abdomen without contrast. M:  CTCWO_3 CT Dose-Length Product:  234 mGy*cm CT Dose Reduction Employed: Automated exposure control(AEC) and iterative recon Comparison: Chest x-ray from earlier the same day, CT scan dated 03/24/2024 RESULT: LIMITATIONS: Absence of IV contrast, mild respiratory motion artifact. LINES, TUBES, AND DEVICES: Cardiac pacemaker. THORACIC INLET, HEART, AND MEDIASTINUM: Several small and mildly prominent mediastinal lymph nodes which appear unchanged. The thoracic aorta and main pulmonary artery are normal in caliber. Cardiomegaly.  No pericardial effusion or thickening. LUNG PARENCHYMA AND PLEURA: Lungs appear emphysematous with mildly prominent interstitial markings.   Mild degree of bronchial wall thickening. Minimal left lower lobe opacity likely represents atelectasis. Minimal bilateral pleural thickening with no pleural effusion. No suspicious pulmonary nodule. Central airways are patent. BONES AND SOFT TISSUES: No destructive bone lesion. Chest wall is unremarkable. UPPER ABDOMEN: Punctate nonobstructing right renal calculus.  Left renal  cysts.    IMPRESSION: Stable small and mildly prominent mediastinal lymph nodes. Cardiomegaly. Emphysema with mildly prominent interstitial markings likely indicating chronic interstitial changes, mild bronchial wall thickening. Left lower lobe atelectasis. [facility]   Transcribe Date/Time: Apr 8 2024  4:05P Dictated by : MAK BONE MD This examination was interpreted and the report reviewed and electronically signed by: MAK BONE MD on Apr 8 2024  4:14PM  EST    XR chest 1 view    Result Date: 4/8/2024  * * *Final Report* * * DATE OF EXAM: Apr 8 2024  2:51PM   [facility]   [id number]  -  XR CHEST 1V FRONTAL PORT  / ACCESSION #  [id number] PROCEDURE REASON: Fatigue and malaise      * * * * Physician Interpretation * * * *  EXAMINATION:  CHEST RADIOGRAPH (PORTABLE SINGLE VIEW AP) Exam Date/Time:  4/8/2024 2:51 PM Clinical History:  Fatigue and malaise Comparison:  03/24/2024 RESULT: LINES, TUBES, AND DEVICES:  Cardiac pacemaker. CARDIOMEDIASTINAL SILHOUETTE:  Stable enlarged cardiac silhouette. LUNGS AND PLEURA: Density left lower hemithorax with poorly visualized left hemidiaphragm may be artifactual related to overlying cardiac silhouette, cannot exclude underlying pleural/parenchymal opacity.  Chest otherwise appears clear.  No pulmonary vascular congestion. No pneumothorax identified. OTHER:  N/A    IMPRESSION: Possible pleural/parenchymal opacity left lower hemithorax versus artifact related to overlying cardiac silhouette. : LINDA   Transcribe Date/Time: Apr 8 2024  2:52P Dictated by : MAK BONE MD This examination was interpreted and the report reviewed and electronically signed by: MAK BONE MD on Apr 8 2024  2:56PM  EST    BI mammo bilateral screening tomosynthesis    Result Date: 3/28/2024  Interpreted By:  Maylin Hidalgo, STUDY: BI MAMMO BILATERAL SCREENING TOMOSYNTHESIS;  3/25/2024 8:05 am   ACCESSION NUMBER(S): [id number]   ORDERING CLINICIAN: CAIN GONZALES    INDICATION: Screening.   COMPARISON: 02/01/2022, 12/15/2020, 11/12/2019   FINDINGS: 2D and tomosynthesis images were reviewed at 1 mm slice thickness. Left-sided pacemaker obscures a portion of the superior left breast and left axilla.   Density:  The breast tissue is heterogeneously dense, which may obscure small masses.   Grouped calcifications in the medial central right breast and circumscribed mass in the lateral central right breast remains stable. No suspicious masses or calcifications are identified.       No mammographic evidence of malignancy.   Based on the Tyrer-Cuzick model for breast cancer risk assessment, the patient's lifetime risk of breast cancer is 4.2%. Patients with over a 20% lifetime risk of developing breast cancer may benefit from additional screening with breast MRI or ultrasound. Please note that this estimate is based on responses provided on the patient questionnaire. For more information regarding high risk consultation, please call [phone].   BI-RADS CATEGORY:   BI-RADS Category:  2 Benign. Recommendation:  Annual Screening. Recommended Date:  1 Year. Laterality:  Bilateral.   For any future breast imaging appointments, please call [phone] ([phone]).     MACRO: None   Signed by: Maylin Hidalgo 3/28/2024 1:22 PM Dictation workstation:   [id number]    CT angio chest w and wo IV contrast    Result Date: 3/24/2024  * * *Final Report* * * DATE OF EXAM: Mar 24 2024 12:00PM   [facility]   0540  -  CT CHEST W IVCON PE  / ACCESSION #  [id number] PROCEDURE REASON: Pulmonary embolism (PE) suspected, high prob      * * * * Physician Interpretation * * * * RESULT: EXAMINATION:  CHEST CT WITH CONTRAST (PULMONARY EMBOLISM PROTOCOL) CLINICAL HISTORY: Suspected PE Technique:  Spiral CT acquisition of the chest from the thoracic inlet to the upper abdomen following IV contrast.  Axial 1 and 3 mm thick slices plus coronal and sagittal reformatted images. MQ:  CTCP_5 Contrast:  76 mL Omnipaque 350 IV CT  Radiation dose: Integrated Dose-length product (DLP) for this visit =   558 mGy*cm CT Dose Reduction Employed: Automated exposure control(AEC) and iterative recon Comparison:  6/13/2023 RESULT: Limitations:  None. Evaluation for thromboembolic disease:      - Right heart chambers:  No thromboembolic disease.      - Main pulmonary arteries:  No thromboembolic disease.      - Lobar pulmonary arteries:  No thromboembolic disease.      - Segmental pulmonary arteries:  No thromboembolic disease.      - Subsegmental pulmonary arteries:  No thromboembolic disease.      - Additional pulmonary artery findings:  The main pulmonary artery is normal in caliber. Lines, tubes, and devices:  Pacer present via the left  side. Lung parenchyma and airways: Emphysema. Tree-in-bud type opacity in the lateral left upper lobe, largest nodule 3 mm (6:88, 86). This is not previously seen stable small calcified left lower lung nodule (6:94). Small airway wall thickening in the lungs. No discrete central airway abnormality. Pleural space:  No pleural effusion.  No pleural thickening. Lower neck, lymph nodes, and mediastinum:  The imaged thyroid gland is normal. Few normal-sized right paratracheal lymph nodes. A slightly enlarged 1.6 cm short axis dimension precarinal lymph node (5:125). This is stable. 2.0 x 1.1 cm lymph node aggregate in the right hilum is suboptimally compared due to lack of contrast from the previous study. Slightly enlarged 1.4 cm short axis dimension subcarinal lymph node measures slightly larger since the previous. There is another 1.5 cm short axis dimension right subcarinal lymph node (5:146). Few stable normal-sized left prevascular lymph nodes. Borderline sized 1.1 cm short axis dimension left hilar lymph node (525) cannot be well compared to the previous study. Heart, pericardium, and thoracic vessels: Suboptimal visualization of the aorta due to lack of opacification. Atherosclerosis of the thoracic aorta. No  aneurysmal dilatation. No pericardial effusion. Enlarged left ventricle is not significantly changed. . Bones and soft tissues:  No destructive bone lesion. Chest wall is unremarkable. Upper abdomen:  Mild reflux of contrast in the IVC and hepatic veins can reflect elevated right heart pressure. Stable too small to characterize hepatic hypodensity, likely cysts  (topogram) images: Unremarkable.    IMPRESSION: No CT evidence of pulmonary embolism. Emphysema. Small airway wall thickening in the lungs. New small area of tree-in-bud type opacity in the lateral left upper lobe with largest nodule 3 mm. Inflammatory/infective etiology is favored. Few slightly enlarged mediastinal lymph nodes. Couple of subcarinal lymph nodes have intervally enlarged as compared to the previous study Enlarged left ventricle. Reflux of contrast in the IVC suggesting elevated right heart pressure Other findings in the result section. Transcribed Using Voice Recognition Transcribe Date/Time: Mar 24 2024 12:30P Dictated by: HUMBERTO JIANG MD This examination was interpreted and the report reviewed and electronically signed by: HUMBERTO JIANG MD on Mar 24 2024 12:56PM  EST    Vascular US upper extremity venous duplex left    Result Date: 3/20/2024  * * *Final Report* * * DATE OF EXAM: Mar 20 2024  2:47PM   [facility]   1003  -  US DVT UPPER LT  / ACCESSION #  [id number] PROCEDURE REASON: Arm deep vein thrombosis (DVT), new symptoms      * * * * Physician Interpretation * * * *  EXAMINATION:  LEFT UPPER EXTREMITY DEEP VENOUS ULTRASOUND WITH DOPPLER IMAGING CLINICAL HISTORY: left arm swelling, pain TECHNIQUE:  Grayscale with compression maneuvers where accessible, color and spectral Doppler of the left internal jugular, subclavian, and axillary veins was performed.  Grayscale with compression maneuvers of the left brachial, basilic and cephalic veins was also performed. The contralateral internal jugular and distal subclavian veins were imaged for  comparison. Images were obtained and stored in a permanent archive. MQ:   USUEL_1 COMPARISON: None RESULT: LEFT UPPER EXTREMITY DEEP VEINS Internal Jugular vein: Abnormal, incomplete compression, partial flow present. Subclavian vein:  Normal, spontaneous flow. Axillary vein:  Normal compression, normal spontaneous flow. Brachial vein:  Normal compression SUPERFICIAL VEINS Basilic vein: Normal compression. Cephalic vein:  Normal compression. Other: Thrombosed superficial vein dorsal distal forearm/wrist at the site of previous IV. RIGHT UPPER EXTREMITY (FOR COMPARISON) DEEP VEINS Internal Jugular and Distal Subclavian veins: Normal compression, normal spontaneous flow.    IMPRESSION: Positive study for chronic DVT in the left upper extremity: Partially compressible clot in the internal jugular vein. Positive study for superficial thrombophlebitis in the imaged segments of the left upper extremity: Previous IV site along the dorsal distal forearm/wrist. URGENT RESULTS Acuity: Urgent Communication: Sonographer communicated with KIARA FELIZ NAILA on   3/20/2024 2:45 PM  via verbal communication. --END OF FINDING-- : LINDA   Transcribe Date/Time: Mar 20 2024  3:34P Dictated by : EUSEBIA REEDER MD This examination was interpreted and the report reviewed and electronically signed by: EUSEBIA REEDER MD on Mar 20 2024  3:38PM  EST       Labs reviewed:    Lab Results   Component Value Date    WBC 8.5 02/29/2024    HGB 15.4 02/29/2024    HCT 48.0 (H) 02/29/2024     02/29/2024    CHOL 119 01/16/2024    TRIG 54 01/16/2024    HDL 51.7 01/16/2024    ALT 7 02/29/2024    AST 24 02/29/2024     02/29/2024    K 4.0 02/29/2024     (H) 02/29/2024    CREATININE 0.96 02/29/2024    BUN 11 02/29/2024    CO2 20 (L) 02/29/2024    TSH 1.49 02/29/2024    INR 1.1 07/02/2022    HGBA1C 5.8 (H) 03/02/2024       Assessment/Plan   Problem List Items Addressed This Visit          Cardiac and Vasculature    HLD  (hyperlipidemia) - Primary     Well controlled  Will evaluate further at next annual visit             Coag and Thromboembolic    Chronic deep vein thrombosis (DVT) of left upper extremity (Multi)     Continue eliquis therapy   Refill sent             Pulmonary and Pneumonias    Chronic obstructive pulmonary disease (Multi)     Prednisone taper  Continue current therapy otherwise   Recheck in a few weeks             Tobacco    Nicotine use disorder     Nicotine gum prescribed  No success for patient with patches in the past             Continue current medications as listed  Follow up in a few weeks for annual

## 2024-04-22 ENCOUNTER — OFFICE VISIT (OUTPATIENT)
Dept: CARDIOLOGY | Facility: CLINIC | Age: 69
End: 2024-04-22
Payer: MEDICAID

## 2024-04-22 VITALS
HEIGHT: 66 IN | WEIGHT: 165 LBS | HEART RATE: 100 BPM | DIASTOLIC BLOOD PRESSURE: 80 MMHG | RESPIRATION RATE: 18 BRPM | OXYGEN SATURATION: 97 % | SYSTOLIC BLOOD PRESSURE: 130 MMHG | BODY MASS INDEX: 26.52 KG/M2

## 2024-04-22 DIAGNOSIS — I50.23 ACUTE ON CHRONIC SYSTOLIC HEART FAILURE (MULTI): ICD-10-CM

## 2024-04-22 DIAGNOSIS — Z95.810 PRESENCE OF AUTOMATIC CARDIOVERTER/DEFIBRILLATOR (AICD): ICD-10-CM

## 2024-04-22 DIAGNOSIS — I51.9 CHRONIC LEFT VENTRICULAR SYSTOLIC DYSFUNCTION: ICD-10-CM

## 2024-04-22 DIAGNOSIS — I72.0 CAROTID ANEURYSM, LEFT (CMS-HCC): ICD-10-CM

## 2024-04-22 DIAGNOSIS — I63.9 CEREBROVASCULAR ACCIDENT (CVA), UNSPECIFIED MECHANISM (MULTI): ICD-10-CM

## 2024-04-22 DIAGNOSIS — Z95.810 PRESENCE OF CARDIAC DEFIBRILLATOR: ICD-10-CM

## 2024-04-22 DIAGNOSIS — I42.0 CONGESTIVE CARDIOMYOPATHY (MULTI): ICD-10-CM

## 2024-04-22 DIAGNOSIS — I42.9 CARDIOMYOPATHY, UNSPECIFIED TYPE (MULTI): Primary | ICD-10-CM

## 2024-04-22 DIAGNOSIS — I10 PRIMARY HYPERTENSION: ICD-10-CM

## 2024-04-22 DIAGNOSIS — I50.23 ACUTE ON CHRONIC SYSTOLIC CONGESTIVE HEART FAILURE (MULTI): ICD-10-CM

## 2024-04-22 DIAGNOSIS — E78.00 PURE HYPERCHOLESTEROLEMIA: ICD-10-CM

## 2024-04-22 PROCEDURE — 3079F DIAST BP 80-89 MM HG: CPT | Performed by: STUDENT IN AN ORGANIZED HEALTH CARE EDUCATION/TRAINING PROGRAM

## 2024-04-22 PROCEDURE — 1126F AMNT PAIN NOTED NONE PRSNT: CPT | Performed by: STUDENT IN AN ORGANIZED HEALTH CARE EDUCATION/TRAINING PROGRAM

## 2024-04-22 PROCEDURE — 3075F SYST BP GE 130 - 139MM HG: CPT | Performed by: STUDENT IN AN ORGANIZED HEALTH CARE EDUCATION/TRAINING PROGRAM

## 2024-04-22 PROCEDURE — 99215 OFFICE O/P EST HI 40 MIN: CPT | Performed by: STUDENT IN AN ORGANIZED HEALTH CARE EDUCATION/TRAINING PROGRAM

## 2024-04-22 PROCEDURE — 1159F MED LIST DOCD IN RCRD: CPT | Performed by: STUDENT IN AN ORGANIZED HEALTH CARE EDUCATION/TRAINING PROGRAM

## 2024-04-22 RX ORDER — NITROGLYCERIN 0.4 MG/1
0.4 TABLET SUBLINGUAL EVERY 5 MIN PRN
Qty: 25 TABLET | Refills: 11 | Status: SHIPPED | OUTPATIENT
Start: 2024-04-22

## 2024-04-22 ASSESSMENT — ENCOUNTER SYMPTOMS
DEPRESSION: 0
LOSS OF SENSATION IN FEET: 0
OCCASIONAL FEELINGS OF UNSTEADINESS: 0

## 2024-04-22 ASSESSMENT — PAIN SCALES - GENERAL: PAINLEVEL: 0-NO PAIN

## 2024-04-22 ASSESSMENT — PATIENT HEALTH QUESTIONNAIRE - PHQ9
SUM OF ALL RESPONSES TO PHQ9 QUESTIONS 1 & 2: 0
1. LITTLE INTEREST OR PLEASURE IN DOING THINGS: NOT AT ALL
2. FEELING DOWN, DEPRESSED OR HOPELESS: NOT AT ALL

## 2024-04-22 NOTE — PROGRESS NOTES
Referred by Dr. Jama ref. provider found for Follow-up (9 month)     HPI:    Chiki Pineda is a 69 y.o. female with pertinent history of hypertension, obstructive sleep apnea, history of cocaine abuse, chronic kidney disease, history of stroke with cerebral aneurysm, history of severe nonischemic dilated cardiomyopathy status post by the ICD, obstructive CAD on cardiac catheterization performed 5/22/2018, moderate to severe cardiomyopathy with ejection fraction of 20 to 25%, supranormal diastolic dysfunction, severe eccentric left ventricular hypertrophy, moderate left atrial enlargement, moderate tricuspid regurgitation, moderate pulmonary hypertension on echo performed 9/12/2022 presents to cardiology clinic for follow-up after recent hospitalization for respiratory failure in the setting of influenza and COPD exacerbation.     Recent hospital course at [facility] was reviewed and discussed.  Dyspnea exertion is slowly improving.  She has been compliant with cardiac medications.  No exacerbating or relieving factors.  Patient denies chest pain and angina.  Pt denies orthopnea, and paroxysmal nocturnal dyspnea.  Pt denies worsening lower extremity edema.  Pt denies palpitations or syncope.  No recent falls.  No fever or chills.  No cough.  No change in bowel or bladder habits.  No sick contacts.  No recent travel.    12 point review of systems including (Constitutional, Eyes, ENMT, Respiratory, Cardiac, Gastrointestinal, Neurological, Psychiatric, and Hematologic) was performed and is otherwise negative.    Past medical history reviewed:   has a past medical history of Encounter for adjustment and management of automatic implantable cardiac defibrillator (09/06/2018), Encounter for general adult medical examination without abnormal findings (12/29/2020), Encounter for general adult medical examination without abnormal findings, and Encounter for general adult medical examination without abnormal findings  (03/08/2021).    Past surgical history reviewed:   has a past surgical history that includes Cardiac surgery (12/03/2015).    Social history reviewed:   reports that she has been smoking cigarettes. She has never used smokeless tobacco. She reports that she does not currently use alcohol. She reports that she does not currently use drugs.     Family history reviewed:  No family history on file.    Allergies reviewed: Isosorbide, Isosorbide mononitrate, and Niacin     Medications reviewed:   Current Outpatient Medications   Medication Instructions    albuterol 90 mcg/actuation inhaler 2 puffs, inhalation, Every 4 hours PRN    apixaban (ELIQUIS) 5 mg, oral, 2 times daily    aspirin 81 mg chewable tablet CHEW AND SWALLOW 1 TABLET BY MOUTH EVERY DAY    atorvastatin (Lipitor) 40 mg tablet TAKE 1 TABLET BY MOUTH ONCE DAILY    buPROPion XL (WELLBUTRIN XL) 300 mg, oral, Daily    carbamide peroxide (Debrox) 6.5 % otic solution 5 drops, Each Ear, 2 times daily    carvedilol (COREG) 25 mg, oral, 2 times daily with meals    digoxin (LANOXIN) 125 mcg, oral, Daily    folic acid (FOLVITE) 1 mg, oral, Daily    furosemide (Lasix) 40 mg tablet TAKE 1 TABLET BY MOUTH ONCE DAILY    hydrALAZINE (Apresoline) 50 mg tablet TAKE ONE (1) TABLET BY MOUTH TWICE DAILY    ipratropium-albuteroL (Duo-Neb) 0.5-2.5 mg/3 mL nebulizer solution 3 mL, nebulization, 4 times daily PRN    lisinopril 40 mg, oral, Daily    multivitamin tablet 1 tablet, oral, Daily    nebulizer accessories (Adult Aerosol Mask) misc 1 each, miscellaneous, 3 times daily    nicotine polacrilex (NICORETTE) 2 mg, Mouth/Throat, As needed    nitroglycerin (Nitrostat) 0.4 mg SL tablet DISSOLVE 1 TABLET UNDER THE TONGUE AS NEEDED FOR CHEST PAIN EVERY 5 MINUTES UP TO 3 TIMES. IF NO RELIEF CALL 911.    predniSONE (Deltasone) 10 mg tablet Take 5 tablets (50 mg) by mouth once daily for 2 days, THEN 4 tablets (40 mg) once daily for 2 days, THEN 3 tablets (30 mg) once daily for 2 days, THEN  2 tablets (20 mg) once daily for 2 days, THEN 1 tablet (10 mg) once daily for 2 days.    predniSONE (DELTASONE) 10 mg, oral, Daily, Take 4 tablets by mouth once daily for 3 days, THEN 3 tablets once daily for 3 days, THEN 2 tablets once daily for 3 days, THEN 1 tablet once daily for 3 days    Spiriva with HandiHaler 18 mcg inhalation capsule INHALE THE CONTENTS OF ONE (1) CAPSULE BY MOUTH VIA HANDIHALER ONCE DAILY AS DIRECTED. DO NOT SWALLOW CAPSULE    spironolactone (ALDACTONE) 25 mg, oral, 2 times daily    Vitamin B-1 100 mg, oral, 3 times daily    Vitamin D3 25 mcg (1,000 unit) capsule TAKE 1 CAPSULE BY MOUTH ONCE DAILY        Vitals reviewed: Visit Vitals  /80   Pulse 100   Resp 18       Physical Exam:   General:  Patient is awake, alert, and oriented.  Patient is in no acute distress.  HEENT:  Pupils equal and reactive.  Normocephalic.  Moist mucosa.    Neck:  No thyromegaly.  Normal Jugular Venous Pressure.  Cardiovascular:  Regular rate and rhythm.  Normal S1 and S2.  1/6 JANELLE.  Pulmonary:  Clear to auscultation bilaterally.  Abdomen:  Soft. Non-tender.   Non-distended.  Positive bowel sounds.  Lower Extremities:  2+ pedal pulses. No LE edema.  Neurologic:  Cranial nerves intact.  No focal deficit.   Skin: Skin warm and dry, normal skin turgor.   Psychiatric: Normal affect.    Last Labs:  CBC -      Lab Results   Component Value Date    WBC 8.5 02/29/2024    HGB 15.4 02/29/2024    HCT 48.0 (H) 02/29/2024     02/29/2024        CMP-  Lab Results   Component Value Date    GLUCOSE 94 02/29/2024     02/29/2024    K 4.0 02/29/2024     (H) 02/29/2024    CO2 20 (L) 02/29/2024    ANIONGAP 16 02/29/2024    BUN 11 02/29/2024    CREATININE 0.96 02/29/2024    EGFR 65 02/29/2024    CALCIUM 9.5 02/29/2024    PHOS 1.2 (L) 07/03/2022    PROT 7.2 02/29/2024    ALBUMIN 4.2 02/29/2024    AST 24 02/29/2024    ALT 7 02/29/2024    ALKPHOS 76 02/29/2024    BILITOT 0.7 02/29/2024        LIPIDS-  Lab Results    Component Value Date    CHOL 119 01/16/2024    TRIG 54 01/16/2024    HDL 51.7 01/16/2024    CHHDL 2.3 01/16/2024    VLDL 11 01/16/2024        OTHERS-  Lab Results   Component Value Date    HGBA1C 5.8 (H) 03/02/2024    BNP 2,029 (H) 02/29/2024        I personally reviewed the patient's recent vitals, labs, medications, orders, EKGs, pertinent cardiac imaging/ echocardiography and ischemic evaluations including stress testing/ cardiac catheterization.    Assessment and Plan:    Chiki Pineda is a 69 y.o. female with pertinent history of hypertension, obstructive sleep apnea, history of cocaine abuse, chronic kidney disease, history of stroke with cerebral aneurysm, history of severe nonischemic dilated cardiomyopathy status post by the ICD, obstructive CAD on cardiac catheterization performed 5/22/2018, moderate to severe cardiomyopathy with ejection fraction of 20 to 25%, supranormal diastolic dysfunction, severe eccentric left ventricular hypertrophy, moderate left atrial enlargement, moderate tricuspid regurgitation, moderate pulmonary hypertension on echo performed 9/12/2022 presents to cardiology clinic for follow-up after recent hospitalization for respiratory failure in the setting of influenza and COPD exacerbation.  Recent hospital course at [facility] was reviewed and discussed.  Dyspnea exertion is slowly improving.  She has been compliant with cardiac medications.     We will obtain a transthoracic echocardiogram for structural evaluation including ejection fraction, assessment of regional wall motion abnormalities or valvular disease, and further evaluation of hemodynamics.    Please continue your current cardiac medications.     I also placed referral for pulmonology.    Please followup with me in Cardiology clinic within the next 4-5 months.  Please return to clinic sooner or seek emergent care if your symptoms reoccur or worsen.    Thank you for allowing me to participate in their care.   Please feel free to call me with any further questions or concerns.        Curly Zamarripa MD, [facility], [facility]  Division of Cardiovascular Medicine  System Director, Nuclear Cardiology   Medical Director, [facility] [place], [facility]   Clinical , [employer] [facility] [employer]  [email or web address]   Office:  [phone]

## 2024-04-22 NOTE — PATIENT INSTRUCTIONS
We will obtain a transthoracic echocardiogram for structural evaluation including ejection fraction, assessment of regional wall motion abnormalities or valvular disease, and further evaluation of hemodynamics.    Please continue your current cardiac medications.     I also placed referral for pulmonology.    Please followup with me in Cardiology clinic within the next 4-5 months.  Please return to clinic sooner or seek emergent care if your symptoms reoccur or worsen.

## 2024-04-23 ENCOUNTER — APPOINTMENT (OUTPATIENT)
Dept: PRIMARY CARE | Facility: CLINIC | Age: 69
End: 2024-04-23
Payer: MEDICAID

## 2024-04-23 DIAGNOSIS — I50.9 CONGESTIVE HEART FAILURE, UNSPECIFIED HF CHRONICITY, UNSPECIFIED HEART FAILURE TYPE (MULTI): ICD-10-CM

## 2024-04-24 RX ORDER — FUROSEMIDE 40 MG/1
TABLET ORAL
Qty: 30 TABLET | Refills: 10 | Status: SHIPPED | OUTPATIENT
Start: 2024-04-24

## 2024-04-25 ENCOUNTER — APPOINTMENT (OUTPATIENT)
Dept: PRIMARY CARE | Facility: CLINIC | Age: 69
End: 2024-04-25
Payer: MEDICAID

## 2024-04-25 DIAGNOSIS — F17.200 NICOTINE USE DISORDER: ICD-10-CM

## 2024-04-25 DIAGNOSIS — J43.9 PULMONARY EMPHYSEMA, UNSPECIFIED EMPHYSEMA TYPE (MULTI): ICD-10-CM

## 2024-04-25 RX ORDER — MICONAZOLE NITRATE 2 %
2 CREAM (GRAM) TOPICAL AS NEEDED
Qty: 100 EACH | Refills: 3 | Status: SHIPPED | OUTPATIENT
Start: 2024-04-25 | End: 2024-04-29 | Stop reason: SDUPTHER

## 2024-04-26 ENCOUNTER — PATIENT OUTREACH (OUTPATIENT)
Dept: CARE COORDINATION | Facility: CLINIC | Age: 69
End: 2024-04-26

## 2024-04-26 ENCOUNTER — APPOINTMENT (OUTPATIENT)
Dept: PRIMARY CARE | Facility: CLINIC | Age: 69
End: 2024-04-26
Payer: MEDICAID

## 2024-04-26 NOTE — PROGRESS NOTES
Unable to reach patient for call back after patient's follow up appointment with PCP.   INGRIDM with call back number for patient to call if needed   If no voicemail available call attempts x 2 were made to contact the patient to assist with any questions or concerns patient may have.

## 2024-04-29 DIAGNOSIS — J43.9 PULMONARY EMPHYSEMA, UNSPECIFIED EMPHYSEMA TYPE (MULTI): ICD-10-CM

## 2024-04-29 RX ORDER — MICONAZOLE NITRATE 2 %
2 CREAM (GRAM) TOPICAL AS NEEDED
Qty: 110 EACH | Refills: 3 | Status: SHIPPED | OUTPATIENT
Start: 2024-04-29

## 2024-04-29 RX ORDER — IPRATROPIUM BROMIDE AND ALBUTEROL SULFATE 2.5; .5 MG/3ML; MG/3ML
SOLUTION RESPIRATORY (INHALATION)
Qty: 180 ML | Refills: 10 | Status: SHIPPED | OUTPATIENT
Start: 2024-04-29

## 2024-05-01 RX ORDER — PREDNISONE 10 MG/1
TABLET ORAL
Qty: 30 TABLET | Refills: 10 | Status: SHIPPED | OUTPATIENT
Start: 2024-05-01 | End: 2024-05-02

## 2024-05-02 DIAGNOSIS — J43.9 PULMONARY EMPHYSEMA, UNSPECIFIED EMPHYSEMA TYPE (MULTI): ICD-10-CM

## 2024-05-02 RX ORDER — PREDNISONE 10 MG/1
TABLET ORAL
Qty: 30 TABLET | Refills: 10 | Status: SHIPPED | OUTPATIENT
Start: 2024-05-02

## 2024-05-22 DIAGNOSIS — E78.5 HYPERLIPIDEMIA, UNSPECIFIED HYPERLIPIDEMIA TYPE: ICD-10-CM

## 2024-05-23 RX ORDER — NAPROXEN SODIUM 220 MG/1
TABLET, FILM COATED ORAL
Qty: 30 TABLET | Refills: 10 | Status: SHIPPED | OUTPATIENT
Start: 2024-05-23

## 2024-06-13 ENCOUNTER — PATIENT OUTREACH (OUTPATIENT)
Dept: CARE COORDINATION | Facility: CLINIC | Age: 69
End: 2024-06-13
Payer: MEDICAID

## 2024-06-13 RX ORDER — AMPICILLIN 2 G/1
2 INJECTION, POWDER, FOR SOLUTION INTRAVENOUS EVERY 6 HOURS
COMMUNITY

## 2024-06-13 RX ORDER — CEFTRIAXONE SODIUM 2 G/1
2 INJECTION, POWDER, FOR SOLUTION INTRAVENOUS EVERY 12 HOURS
COMMUNITY

## 2024-06-13 NOTE — PROGRESS NOTES
90 day readmission  Discharge Facility: [facility]  Discharge Diagnosis: AICD removal, Infective endocarditis of cardiac valve, HFrEF, atrial fibrillation, enterococcus faecalis infection  Admission Date: 6/2/24  Discharge Date: 6/12/24    PCP Appointment Date: no appointment, message to office  Specialist Appointment Date: needs colonoscopy, EP 6/26 and 9/10/24  Hospital Encounter and Summary: Linked  See discharge assessment below for further details    Medications  Medications reviewed with patient/caregiver?: Yes (6/13/2024 10:40 AM)  Is the patient having any side effects they believe may be caused by any medication additions or changes?: No (6/13/2024 10:40 AM)  Does the patient have all medications ordered at discharge?: No (daughter is picking up today) (6/13/2024 10:40 AM)  Medication Comments: start - ceftriaxone, ampicillin, eliquis. change spirinolactone, hydralazine, furosemide, digoxin. stop - lisinopril, multivitamin, mirtazapine (6/13/2024 10:40 AM)    Appointments  Does the patient have a primary care provider?: Yes (6/13/2024 10:40 AM)  Care Management Interventions: Advised patient to make appointment (6/13/2024 10:40 AM)  Care Management Interventions: Advised patient to keep appointment (6/13/2024 10:40 AM)    Self Management  What is the home health agency?: [facility] (6/13/2024 10:40 AM)  Has home health visited the patient within 72 hours of discharge?: Yes (6/13/2024 10:40 AM)  What Durable Medical Equipment (DME) was ordered?: life vest (6/13/2024 10:40 AM)    Patient Teaching  Does the patient have access to their discharge instructions?: Yes (6/13/2024 10:40 AM)  What is the patient's perception of their health status since discharge?: Improving (6/13/2024 10:40 AM)  Patient/Caregiver Education Comments: Patient reports being tired, happy to be home. Denies any symptoms or concerns. Patient's daughter is picking up medications this morning, patient unaware of some  medication changes, reviewed with patient and encouraged to reconcile with discharge medication list once prescriptions obtained. Provided contact number and encouraged to call if needed. Home care seen patient this morning for IV antibiotic teaching. Follow up appointments reviewed, message sent to office for PCP appointment. (6/13/2024 10:40 AM)

## 2024-06-21 DIAGNOSIS — F17.200 NICOTINE USE DISORDER: ICD-10-CM

## 2024-06-21 DIAGNOSIS — I82.722 CHRONIC DEEP VEIN THROMBOSIS (DVT) OF LEFT UPPER EXTREMITY, UNSPECIFIED VEIN (MULTI): ICD-10-CM

## 2024-06-21 DIAGNOSIS — E78.5 HYPERLIPIDEMIA, UNSPECIFIED HYPERLIPIDEMIA TYPE: ICD-10-CM

## 2024-06-21 NOTE — LETTER
June 25, 2024    Chiki Pineda  [address] [address] [place], [place] [id number]      To whom this may concern:    Chiki Pineda [birth year] is a patient under my care she is currently in the hospital and needs her son to be there with her for support at this time.    If you have any questions or concerns, please don't hesitate to call.    Sincerely,             Ernestina Flynn MD        CC: No Recipients

## 2024-06-24 ENCOUNTER — APPOINTMENT (OUTPATIENT)
Dept: PRIMARY CARE | Facility: CLINIC | Age: 69
End: 2024-06-24
Payer: MEDICAID

## 2024-06-26 RX ORDER — APIXABAN 5 MG/1
5 TABLET, FILM COATED ORAL 2 TIMES DAILY
Qty: 60 TABLET | Refills: 10 | Status: SHIPPED | OUTPATIENT
Start: 2024-06-26

## 2024-06-26 RX ORDER — ATORVASTATIN CALCIUM 40 MG/1
TABLET, FILM COATED ORAL
Qty: 30 TABLET | Refills: 10 | Status: SHIPPED | OUTPATIENT
Start: 2024-06-26

## 2024-06-26 RX ORDER — MICONAZOLE NITRATE 2 %
CREAM (GRAM) TOPICAL
Qty: 50 EACH | Refills: 10 | Status: SHIPPED | OUTPATIENT
Start: 2024-06-26

## 2024-06-28 ENCOUNTER — APPOINTMENT (OUTPATIENT)
Dept: PRIMARY CARE | Facility: CLINIC | Age: 69
End: 2024-06-28
Payer: MEDICAID

## 2024-07-02 ENCOUNTER — PATIENT OUTREACH (OUTPATIENT)
Dept: CARE COORDINATION | Facility: CLINIC | Age: 69
End: 2024-07-02
Payer: MEDICAID

## 2024-07-02 ENCOUNTER — DOCUMENTATION (OUTPATIENT)
Dept: CARE COORDINATION | Facility: CLINIC | Age: 69
End: 2024-07-02
Payer: MEDICAID

## 2024-07-02 NOTE — PROGRESS NOTES
Discharge Facility:- [facility]  Discharge Diagnosis: Cavitary lesion of lung   Admission Date: 6/29/24  Discharge Date: 7/1/24  30 day readmission  (6/20 - 6/29/24 at [facility] before transfer to [facility])    PCP Appointment Date: not scheduled at this time per patient preference, message to office  Specialist Appointment Date: needs cardiology, 7/18 pulmonology, 7/19 infectious disease - patient aware  Hospital Encounter and Summary: Linked  See discharge assessment below for further details    Medications  Medications reviewed with patient/caregiver?: Yes (7/2/2024  3:51 PM)  Is the patient having any side effects they believe may be caused by any medication additions or changes?: No (7/2/2024  3:51 PM)  Does the patient have all medications ordered at discharge?: Yes (7/2/2024  3:51 PM)  Medication Comments: no new/changed medications at this discharge (7/2/2024  3:51 PM)    Appointments  Does the patient have a primary care provider?: Yes (7/2/2024  3:51 PM)  Care Management Interventions: Advised patient to keep appointment; Advised to schedule with specialist (7/2/2024  3:51 PM)    Self Management  What is the home health agency?: [facility] infusion [facility], [facility] (7/2/2024  3:51 PM)  Has home health visited the patient within 72 hours of discharge?: Yes (7/2/2024  3:51 PM)    Patient Teaching  Does the patient have access to their discharge instructions?: Yes (7/2/2024  3:51 PM)  Care Management Interventions: Reviewed instructions with patient (7/2/2024  3:51 PM)  What is the patient's perception of their health status since discharge?: Improving (7/2/2024  3:51 PM)  Patient/Caregiver Education Comments: Patient reports feeling better. She states she is not wearing the Life Vest because it bothers her and feels tight. Discussed need/importance for Life Vest and encouraged patient to call for cardiology appointment scheduling asap. Patient declined scheduling with PCP stating she would like to  call office at a later time. Home health care is active, patient is being taught IV antibiotics. (7/2/2024  3:51 PM)

## 2024-07-08 ENCOUNTER — DOCUMENTATION (OUTPATIENT)
Dept: CARE COORDINATION | Facility: CLINIC | Age: 69
End: 2024-07-08
Payer: MEDICAID

## 2024-09-16 ENCOUNTER — APPOINTMENT (OUTPATIENT)
Dept: CARDIOLOGY | Facility: CLINIC | Age: 69
End: 2024-09-16
Payer: MEDICAID